# Patient Record
Sex: MALE | Race: WHITE | NOT HISPANIC OR LATINO | Employment: OTHER | URBAN - METROPOLITAN AREA
[De-identification: names, ages, dates, MRNs, and addresses within clinical notes are randomized per-mention and may not be internally consistent; named-entity substitution may affect disease eponyms.]

---

## 2017-02-26 ENCOUNTER — HOSPITAL ENCOUNTER (EMERGENCY)
Facility: HOSPITAL | Age: 27
Discharge: HOME | End: 2017-02-26
Attending: EMERGENCY MEDICINE
Payer: MEDICAID

## 2017-02-26 VITALS
WEIGHT: 170 LBS | SYSTOLIC BLOOD PRESSURE: 113 MMHG | OXYGEN SATURATION: 97 % | RESPIRATION RATE: 18 BRPM | HEART RATE: 98 BPM | DIASTOLIC BLOOD PRESSURE: 69 MMHG | TEMPERATURE: 98 F | HEIGHT: 67 IN | BODY MASS INDEX: 26.68 KG/M2
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
## 2017-02-26 DIAGNOSIS — R40.4 ALTERED LEVEL OF CONSCIOUSNESS: ICD-10-CM

## 2017-02-26 DIAGNOSIS — F10.929 ALCOHOL INTOXICATION, WITH UNSPECIFIED COMPLICATION: Primary | ICD-10-CM

## 2017-02-26 LAB
ALBUMIN SERPL BCP-MCNC: 4.5 G/DL
ALP SERPL-CCNC: 72 U/L
ALT SERPL W/O P-5'-P-CCNC: 18 U/L
AMPHET+METHAMPHET UR QL: NEGATIVE
ANION GAP SERPL CALC-SCNC: 13 MMOL/L
APTT BLDCRRT: 24.6 SEC
AST SERPL-CCNC: 23 U/L
BARBITURATES UR QL SCN>200 NG/ML: NEGATIVE
BASOPHILS # BLD AUTO: 0.03 K/UL
BASOPHILS NFR BLD: 0.4 %
BENZODIAZ UR QL SCN>200 NG/ML: NEGATIVE
BILIRUB SERPL-MCNC: 0.3 MG/DL
BILIRUB UR QL STRIP: NEGATIVE
BUN SERPL-MCNC: 8 MG/DL
BZE UR QL SCN: NEGATIVE
CALCIUM SERPL-MCNC: 9.2 MG/DL
CANNABINOIDS UR QL SCN: NEGATIVE
CHLORIDE SERPL-SCNC: 108 MMOL/L
CLARITY UR REFRACT.AUTO: CLEAR
CO2 SERPL-SCNC: 22 MMOL/L
COLOR UR AUTO: NORMAL
CREAT SERPL-MCNC: 0.9 MG/DL
CREAT UR-MCNC: 16.1 MG/DL
DIFFERENTIAL METHOD: NORMAL
EOSINOPHIL # BLD AUTO: 0.1 K/UL
EOSINOPHIL NFR BLD: 1.5 %
ERYTHROCYTE [DISTWIDTH] IN BLOOD BY AUTOMATED COUNT: 12.7 %
EST. GFR  (AFRICAN AMERICAN): >60 ML/MIN/1.73 M^2
EST. GFR  (NON AFRICAN AMERICAN): >60 ML/MIN/1.73 M^2
ETHANOL SERPL-MCNC: 251 MG/DL
GLUCOSE SERPL-MCNC: 103 MG/DL
GLUCOSE UR QL STRIP: NEGATIVE
HCT VFR BLD AUTO: 43.5 %
HGB BLD-MCNC: 14.9 G/DL
HGB UR QL STRIP: NEGATIVE
INR PPP: 1
KETONES UR QL STRIP: NEGATIVE
LEUKOCYTE ESTERASE UR QL STRIP: NEGATIVE
LYMPHOCYTES # BLD AUTO: 2.6 K/UL
LYMPHOCYTES NFR BLD: 38 %
MCH RBC QN AUTO: 29.6 PG
MCHC RBC AUTO-ENTMCNC: 34.3 %
MCV RBC AUTO: 86 FL
METHADONE UR QL SCN>300 NG/ML: NEGATIVE
MONOCYTES # BLD AUTO: 0.4 K/UL
MONOCYTES NFR BLD: 6.2 %
NEUTROPHILS # BLD AUTO: 3.7 K/UL
NEUTROPHILS NFR BLD: 53.6 %
NITRITE UR QL STRIP: NEGATIVE
OPIATES UR QL SCN: NEGATIVE
PCP UR QL SCN>25 NG/ML: NEGATIVE
PH UR STRIP: 6 [PH] (ref 5–8)
PLATELET # BLD AUTO: 341 K/UL
PMV BLD AUTO: 10.4 FL
POTASSIUM SERPL-SCNC: 4.2 MMOL/L
PROT SERPL-MCNC: 7.2 G/DL
PROT UR QL STRIP: NEGATIVE
PROTHROMBIN TIME: 10 SEC
RBC # BLD AUTO: 5.04 M/UL
SODIUM SERPL-SCNC: 143 MMOL/L
SP GR UR STRIP: <=1.005 (ref 1–1.03)
TOXICOLOGY INFORMATION: ABNORMAL
URN SPEC COLLECT METH UR: NORMAL
UROBILINOGEN UR STRIP-ACNC: NEGATIVE EU/DL
WBC # BLD AUTO: 6.89 K/UL

## 2017-02-26 PROCEDURE — 93005 ELECTROCARDIOGRAM TRACING: CPT

## 2017-02-26 PROCEDURE — 80320 DRUG SCREEN QUANTALCOHOLS: CPT

## 2017-02-26 PROCEDURE — 85610 PROTHROMBIN TIME: CPT

## 2017-02-26 PROCEDURE — 93010 ELECTROCARDIOGRAM REPORT: CPT | Mod: ,,, | Performed by: INTERNAL MEDICINE

## 2017-02-26 PROCEDURE — 80053 COMPREHEN METABOLIC PANEL: CPT

## 2017-02-26 PROCEDURE — 85025 COMPLETE CBC W/AUTO DIFF WBC: CPT

## 2017-02-26 PROCEDURE — 99900035 HC TECH TIME PER 15 MIN (STAT)

## 2017-02-26 PROCEDURE — 82570 ASSAY OF URINE CREATININE: CPT

## 2017-02-26 PROCEDURE — 25000003 PHARM REV CODE 250: Performed by: EMERGENCY MEDICINE

## 2017-02-26 PROCEDURE — 99284 EMERGENCY DEPT VISIT MOD MDM: CPT | Mod: 25

## 2017-02-26 PROCEDURE — 81003 URINALYSIS AUTO W/O SCOPE: CPT

## 2017-02-26 PROCEDURE — 85730 THROMBOPLASTIN TIME PARTIAL: CPT

## 2017-02-26 PROCEDURE — P9612 CATHETERIZE FOR URINE SPEC: HCPCS

## 2017-02-26 RX ADMIN — SODIUM CHLORIDE 1000 ML: 0.9 INJECTION, SOLUTION INTRAVENOUS at 08:02

## 2017-02-27 NOTE — ED NOTES
Pt attempted to urinate in urinal for urine specimen but was unable to urinate. MD states ok to straight cath. Straight cath performed using sterile technique. Pt tolerated.

## 2017-02-27 NOTE — DISCHARGE INSTRUCTIONS
"  Alcohol Intoxication  Alcohol intoxication occurs when you drink alcohol faster than your liver can remove it from your system. The following facts are important to remember:  · It can take 10 minutes or more to start to feel the effects of a drink, so you can easily get more intoxicated than you intended.  · One drink may be more than 1 serving of alcohol. Depending on the drink, it can be 2 to 4 servings.  · It takes about an hour for your body to metabolize (clear) 1 serving. If you have more than 1 drink, it can take a couple of hours or more.  · Many things affect how drinks will affect you, including whether youve eaten, how fast you drink, your size, how much you normally drink (or not), medicines you take, chronic diseases you have, and gender.  Signs and symptoms of alcohol poisoning  The following are signs and symptoms of alcohol poisoning:  Mild impairment  · Reduced inhibitions  · Slurred speech  · Drowsiness  · Decreased fine motor skills  Moderate impairment  · Erratic behavior, aggression, depression  · Impaired judgment  · Confusion  · Concentration difficulties  · Coordination problems  Severe impairment  · Vomiting  · Seizures  · Unconsciousness  · Cold, clammy  · Slow or irregular breathing  · Hypothermia (low body temperature)  · Coma  Health effects  Alcohol abuse causes health problems. Sometimes this can happen after only drinking a little." There is no set number of drinks or amount of alcohol that defines too much. The more you drink at one time, and the more frequently you drink determine both the short-term and long-term health effects. It affects all parts of your body and your health, including your:  · Brain. Alcohol is a central nervous system depressant. It can damage parts of the brain that affect your balance, memory, thinking, and emotions. It can cause memory loss, blackouts, depression, agitation, sleep cycle changes, and seizures. These changes may or may not be " reversible.  · Heart and vascular system. Alcohol affects multiple areas. It can damage heart muscle causing cardiomyopathy, which is a weakening and stretching of the heart muscle. This can lead to trouble breathing, an irregular heartbeat, atrial fibrillation, leg swelling, and heart failure. It makes the blood vessels stiffen causing hypertension (high blood pressure). All of these problems increase your risk of having heart attacks or strokes.  · Liver. Alcohol causes fat to build up in the liver, affecting its normal function. This increases the risk for hepatitis, leading to abdominal pain, appetite loss, jaundice, bleeding problems, liver fibrosis, and cirrhosis. This in turn can affect your ability to fight off infections, and can cause diabetes. The liver changes prevent it from removing toxins in your blood that can cause encephalopathy. Signs of this are confusion, altered level of consciousness, personality changes, memory loss, seizures, coma, and death.  · Pancreas. Alcohol can cause inflammation of the pancreas, or pancreatitis. This can cause pain in your abdomen, fever, and diabetes.  · Immune system. Alcohol weakens your immune system in a number of ways. It suppresses your immune system making it harder to fight off infections and colds. You will also have a higher risk of certain infections like pneumonia and tuberculosis.  · Cancer risk. Alcohol raises your risk of cancer of the mouth, esophagus, pharynx, larynx, liver, and breast.  · Sexual function. Alcohol abuse can also lead to sexual problems.  Alcohol use during pregnancy may cause permanent damage to the growing baby.  Home care  The following guidelines will help you care for yourself at home:  · Don't drink any more alcohol.  · Don't drive until all effects of the alcohol have worn off.  · Don't operate machinery that can cause injuries.  · Get lots of rest over the next few days. Drink plenty of water and other non-alcoholic liquids.  Try to eat regular meals.  · If you have been drinking heavily on a daily basis, you may go through alcohol withdrawal. The usual symptoms last 3 to 4 days and may include nervousness, shakiness, nausea, sweating, sleeplessness, and can even cause seizures and a serious withdrawal symptom called delirium tremens, or DTs. During this time, it is best that you stay with family or friends who can help and support you. You can also admit yourself to a residential detox program. If your symptoms are severe (seizures, severe shakiness, confusion), contact your doctor or call an ambulance for help (see below).   Follow-up care  If alcohol is a problem in your life, these are some organizations that can help you:  · Alcoholics Anonymous offers support through a self-help fellowship. There are no dues or fees. See the Yellow Pages and call for time and place of meetings. Find AA online at www.aa.org.  · [facility] offers support to families of alcohol users. Contact 470-034-1444, or online at www.al-anon.org.  · National [employer] on Alcoholism and Drug Dependence can be reached at 559-325-1700, or online at www.ncadd.org.  · There are also inpatient and residential alcohol detox programs. Check the Internet or phonebook Yellow Pages under Drug Abuse and Treatment Centers.  Call 911  Call 911 if any of these occur:  · Trouble breathing or slow irregular breathing  · Chest pain  · Sudden weakness on one side of your body or sudden trouble speaking  · Heavy bleeding or vomiting blood  · Very drowsy or trouble awakening  · Fainting or loss of consciousness  · Rapid heart rate  · Seizure  When to seek medical advice  Call your healthcare provider right away if any of these occur:  · Severe shakiness   · Fever over 100.4º F (38.0º C)  · Confusion or hallucinations (seeing, hearing, or feeling things that are not there)  · Pain in your upper abdomen that gets worse  · Repeated vomiting  Date Last Reviewed: 6/1/2016  © 9480-7445 [employer]. [address] [place] [address], [place] [address]. All rights reserved. This information is not intended as a substitute for professional medical care. Always follow your healthcare professional's instructions.          Uncertain Causes of Fall  You have had a fall today. But the cause of your fall is not certain. Falls can occur due to slipping, tripping or losing your balance. A fall can also occur from a fainting spell or seizure.  While a fall can happen for a simple reason (tripping over something), falls in elderly people are often caused by a combination of things:  · Age-related decline in function with worsening balance, stability, vision, and muscle strength  · Chronic illness such as heart arrhythmias, heart valve disease, vascular disease, COPD, diabetes, strokes, arthritis  · Effects or side effects of medicines  · Dehydration.  · Environmental hazards such as uneven or slippery ground, unfamiliar place, obstacles, uneven surfaces, or slippery ground  · Situational factors (related to the activity being done, e.g., rushing to the bathroom)  Because the cause of your fall today is not certain, it is possible that a fainting spell or seizure was the cause. This means that it could happen again, without warning. If you fall again, without a cause, then you should return to this facility promptly to have further tests. Otherwise, follow up with your doctor as explained below.  It is normal to feel sore and tight in your muscles and back the next day, and not just the muscles you initially injured. Remember, all the parts of your body are connected, so while initially one area hurts, the next day another may hurt. Also, when you injure yourself, it causes inflammation, which then causes the muscles to tighten up and hurt more. After the initial worsening, it should gradually improve over the next few days. However, more severe pain should be reported.  Even without a definite head injury, you  can still get a concussion. Concussions and even bleeding can still occur, especially if you have had a recent injury or take blood thinner medicine. It is not unusual to have a mild headache and feel tired and even nauseous or dizzy.  Home care  · Rest today and resume your normal activities as soon as you are feeling back to normal. It is best to remain with someone who can check on you for the next 24 hours to watch for another episode of falling.  · If you were injured during the fall, follow the advice from your doctor regarding care of your injury.  ·  If you become light-headed or dizzy, lie down immediately or sit and lean forward with your head down.  · As a precaution, do not drive a car or operate dangerous equipment, do not take a bath alone (use a shower instead) and do not swim alone until you see your doctor. A condition causing fainting or seizures must be ruled out before resuming these activities.  · You may use acetaminophen or ibuprofen to control pain, unless another pain medicine was prescribed. If you have chronic liver or kidney disease or ever had a stomach ulcer or gastrointestinal bleeding, talk with your doctor before using these medicines.  · Keep your appointments for any further testing that may have been scheduled for you.  Follow-up care  Follow up with your healthcare provider, or as advised.  If X-rays or CT scan were done, you will be notified if there is a change in the reading, especially if it affects treatment.  Call 911  Call 911 if any of these occur:  · Trouble breathing  · Confused or difficulty arousing  · Fainting or loss of consciousness  · Rapid or very slow heart rate  · Seizure  · Difficulty with speech or vision, weakness of an arm or leg  · Difficulty walking or talking, loss of balance, numbness or weakness in one side of your body, facial droop  When to seek medical advice  Call your healthcare provider right away if any of these occur:  · Another unexplained  fall  · Dizziness  · Severe headache  · Nausea and vomiting  · Blood in vomit, stools (black or red color)  Date Last Reviewed: 11/5/2015  © 8292-2899 [employer]. [address] [place] [address], [place] [address]. All rights reserved. This information is not intended as a substitute for professional medical care. Always follow your healthcare professional's instructions.

## 2017-02-27 NOTE — ED NOTES
Pt states unable to provide urine specimen. TAYLOR Espinoza at bedside to perform in & out cath per MD order.

## 2017-02-27 NOTE — ED NOTES
Pt states no further needs/concerns. resp even, unlabored. No distress noted. Pt AAOx3 and answers all questions appropriately. Pt tranfers from stretcher to wheelchair with no assistance needed. Will d/c per MD order.
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
## 2017-02-27 NOTE — ED PROVIDER NOTES
SCRIBE #1 NOTE: I, Matt Birch, am scribing for, and in the presence of, No att. providers found. I have scribed the entire note.      History      Chief Complaint   Patient presents with    Altered Mental Status     per family member, pt had been out drinking w/ grandpaw all day & came home altered. unsure of drug use? pt denies. c/o hip & pelvic pain.        Review of patient's allergies indicates:  No Known Allergies     HPI   HPI    2/26/2017, 8:17 PM   History obtained from the patient      History of Present Illness: Jared Michael Cowden is a 27 y.o. male patient who presents to the Emergency Department for abnormal behavior and alcohol ingestion.  The patient was at a local bar this evening and was playing music and was out with his family members.  The patient appeared to be acting normally according to family and friends.  The patient then went home, and the family noted the patient started acting inappropriately fidgeting his legs and then began crying and complaining about pain from a fall a week ago.  The patient admitted to drinking eight 12 ounce beers tonight but denied taking any illegal or illicit substances.  The patient does complain of bilateral elbow pain and left hip pain; there is no history mention of fever chills nausea or vomiting.  The patient is also tearful and has noted facial contortions while talking.  There are no other major concerns or complaints noted at this time.      Arrival mode: Personal vehicle      PCP: Lorenzo Morley MD       Past Medical History:  History reviewed. No pertinent past medical history.    Past Surgical History:  Past Surgical History:   Procedure Laterality Date    ANKLE SURGERY      Right    HIP SURGERY      PELVIC SYMPHYSIS FUSION           Family History:  History reviewed. No pertinent family history.    Social History:  Social History     Social History Main Topics    Smoking status: Former Smoker    Smokeless tobacco: Never Used    Alcohol  use Yes    Drug use: No      Comment: pt denies     Sexual activity: Not on file       ROS   Review of Systems   Constitutional: Negative for fever.   HENT: Negative for sore throat.    Respiratory: Negative for shortness of breath.    Cardiovascular: Negative for chest pain.   Gastrointestinal: Negative for nausea.   Genitourinary: Negative for dysuria.   Musculoskeletal: Negative for back pain.        Bilateral elbow and left hip pain   Skin: Negative for rash.   Neurological: Negative for weakness.   Hematological: Does not bruise/bleed easily.   Psychiatric/Behavioral: Positive for agitation.        Tearful   All other systems reviewed and are negative.      Physical Exam    Initial Vitals   BP Pulse Resp Temp SpO2   02/26/17 2002 02/26/17 2002 02/26/17 2002 -- 02/26/17 2002   147/77 120 20  96 %      Physical Exam  Nursing Notes and Vital Signs Reviewed.  Constitutional: Patient is in no acute distress. Awake and alert. Well-developed and well-nourished.  Tearful and crying with facial contortions.  Head: Atraumatic. Normocephalic.  Eyes: PERRL. EOM intact. Conjunctivae are not pale. No scleral icterus.  ENT: Mucous membranes are moist. Oropharynx is clear and symmetric.    Neck: Supple. Full ROM. No lymphadenopathy.  Cardiovascular: Regular rate. Regular rhythm. No murmurs, rubs, or gallops. Distal pulses are 2+ and symmetric.  Pulmonary/Chest: No respiratory distress. Clear to auscultation bilaterally. No wheezing, rales, or rhonchi.  Abdominal: Soft and non-distended.  There is no tenderness.  No rebound, guarding, or rigidity. Good bowel sounds.  Genitourinary: No CVA tenderness  Musculoskeletal: Moves all extremities. No obvious deformities. No edema. No calf tenderness.  Skin: Warm and dry.  Neurological:  Alert, awake, and appropriate.  Normal speech.  No acute focal neurological deficits are appreciated.  Psychiatric: Normal affect. Good eye contact. Appropriate in content.    ED Course   "  Procedures  ED Vital Signs:  Vitals:    02/26/17 2002 02/26/17 2006 02/26/17 2109 02/26/17 2255   BP: (!) 147/77  121/77 113/69   Pulse: (!) 120 (!) 127 109 98   Resp: 20 19 18   Temp: 97.9 °F (36.6 °C)   97.9 °F (36.6 °C)   TempSrc: Oral   Oral   SpO2: 96%  98% 97%   Weight: 77.1 kg (170 lb)      Height: 5' 7" (1.702 m)          Abnormal Lab Results:  Labs Reviewed   COMPREHENSIVE METABOLIC PANEL - Abnormal; Notable for the following:        Result Value    CO2 22 (*)     All other components within normal limits   DRUG SCREEN PANEL, URINE EMERGENCY - Abnormal; Notable for the following:     Creatinine, Random Ur 16.1 (*)     All other components within normal limits   ALCOHOL,MEDICAL (ETHANOL) - Abnormal; Notable for the following:     Alcohol, Medical, Serum 251 (*)     All other components within normal limits   APTT   PROTIME-INR   CBC W/ AUTO DIFFERENTIAL   URINALYSIS        All Lab Results:  Results for orders placed or performed during the hospital encounter of 02/26/17   APTT   Result Value Ref Range    aPTT 24.6 21.0 - 32.0 sec   Protime-INR   Result Value Ref Range    Prothrombin Time 10.0 9.0 - 12.5 sec    INR 1.0 0.8 - 1.2   CBC auto differential   Result Value Ref Range    WBC 6.89 3.90 - 12.70 K/uL    RBC 5.04 4.60 - 6.20 M/uL    Hemoglobin 14.9 14.0 - 18.0 g/dL    Hematocrit 43.5 40.0 - 54.0 %    MCV 86 82 - 98 fL    MCH 29.6 27.0 - 31.0 pg    MCHC 34.3 32.0 - 36.0 %    RDW 12.7 11.5 - 14.5 %    Platelets 341 150 - 350 K/uL    MPV 10.4 9.2 - 12.9 fL    Gran # 3.7 1.8 - 7.7 K/uL    Lymph # 2.6 1.0 - 4.8 K/uL    Mono # 0.4 0.3 - 1.0 K/uL    Eos # 0.1 0.0 - 0.5 K/uL    Baso # 0.03 0.00 - 0.20 K/uL    Gran% 53.6 38.0 - 73.0 %    Lymph% 38.0 18.0 - 48.0 %    Mono% 6.2 4.0 - 15.0 %    Eosinophil% 1.5 0.0 - 8.0 %    Basophil% 0.4 0.0 - 1.9 %    Differential Method Automated    Comprehensive metabolic panel   Result Value Ref Range    Sodium 143 136 - 145 mmol/L    Potassium 4.2 3.5 - 5.1 mmol/L    " Chloride 108 95 - 110 mmol/L    CO2 22 (L) 23 - 29 mmol/L    Glucose 103 70 - 110 mg/dL    BUN, Bld 8 6 - 20 mg/dL    Creatinine 0.9 0.5 - 1.4 mg/dL    Calcium 9.2 8.7 - 10.5 mg/dL    Total Protein 7.2 6.0 - 8.4 g/dL    Albumin 4.5 3.5 - 5.2 g/dL    Total Bilirubin 0.3 0.1 - 1.0 mg/dL    Alkaline Phosphatase 72 55 - 135 U/L    AST 23 10 - 40 U/L    ALT 18 10 - 44 U/L    Anion Gap 13 8 - 16 mmol/L    eGFR if African American >60.0 >60 mL/min/1.73 m^2    eGFR if non African American >60.0 >60 mL/min/1.73 m^2   Drug screen panel, emergency   Result Value Ref Range    Benzodiazepines Negative     Methadone metabolites Negative     Cocaine (Metab.) Negative     Opiate Scrn, Ur Negative     Barbiturate Screen, Ur Negative     Amphetamine Screen, Ur Negative     THC Negative     Phencyclidine Negative     Creatinine, Random Ur 16.1 (L) 23.0 - 375.0 mg/dL    Toxicology Information SEE COMMENT    Ethanol   Result Value Ref Range    Alcohol, Medical, Serum 251 (H) <10 mg/dL   Urinalysis   Result Value Ref Range    Specimen UA Urine, Clean Catch     Color, UA Straw Yellow, Straw, Sirisha    Appearance, UA Clear Clear    pH, UA 6.0 5.0 - 8.0    Specific Gravity, UA <=1.005 1.005 - 1.030    Protein, UA Negative Negative    Glucose, UA Negative Negative    Ketones, UA Negative Negative    Bilirubin (UA) Negative Negative    Occult Blood UA Negative Negative    Nitrite, UA Negative Negative    Urobilinogen, UA Negative <2.0 EU/dL    Leukocytes, UA Negative Negative         Imaging Results:  Imaging Results         X-Ray Elbow Complete Bilateral (Final result) Result time:  02/26/17 21:05:55    Final result by Diogenes Campos MD (02/26/17 21:05:55)    Impression:     Normal bilateral elbows      Electronically signed by: DIOGENES CAMPOS MD  Date:     02/26/17  Time:    21:05     Narrative:    Bilateral elbow 3 views    Clinical indication: Elbow pain acute trauma    Findings: The elbows are intact on both sides.  No fractures.  No  sign of joint effusion.            X-Ray Hip 2 View Left (Final result) Result time:  02/26/17 21:03:31    Final result by Diogenes Campos MD (02/26/17 21:03:31)    Impression:     Normal left hip      Electronically signed by: DIOGENES CAMPOS MD  Date:     02/26/17  Time:    21:03     Narrative:    Left hip 2 views    Clinical indication: Hip pain acute trauma    Findings: The hip joint spaces preserved.  No fractures or dislocations.  There is a metal plate along the symphysis pubis along with a separate screw through the left SI joint.  These are old.            X-Ray Chest 1 View (Final result) Result time:  02/26/17 21:04:05    Final result by Diogenes Campos MD (02/26/17 21:04:05)    Impression:     Normal chest      Electronically signed by: DIOGENES CAMPOS MD  Date:     02/26/17  Time:    21:04     Narrative:    Single view chest    Clinical indication: Chest trauma    Findings: The heart and lungs are normal.  No infiltrates.  No pneumothorax.            CT Head Without Contrast (Final result) Result time:  02/26/17 21:02:45    Final result by Diogenes Campos MD (02/26/17 21:02:45)    Impression:     Normal CT of the brain      Electronically signed by: DIOGENES CAMPOS MD  Date:     02/26/17  Time:    21:02     Narrative:    CT of the brain without contrast    Clinical indication: Transient alteration of awareness    Findings: The ventricles are normal in size and position without midline shift.  Cerebral density is normal.  No hemorrhage or mass effect.                EKG Readings: (Independently Interpreted)   Initial Reading: No STEMI. Rhythm: Sinus Tachycardia. Heart Rate: 118. Ectopy: No Ectopy. Conduction: Normal. Axis: Normal.        The Emergency Provider reviewed the vital signs and test results, which are outlined above.    ED Discussion     8:45 PM - Re-evaluation:  The patient is resting comfortably and is in no acute distress. He states that his symptoms have improved after treatment  within ER. Discussed test results and notified of pending labs. Answered questions at this time.     9:06 AM - Re-evaluation: The patient is resting comfortably and is in no acute distress. He states that his symptoms have improved after treatment within ER. Discussed test results, shared treatment plan, specific conditions for return, and importance of follow up with patient and family.  He understands and agrees with the plan as discussed. Answered  his questions at this time. He has remained hemodynamically stable throughout the ED course and is appropriate for discharge home.  The patient will be discharged home with instructions to follow-up with his primary care physician or return to the ED for any worsening symptoms or any other major concern.  The patient is also advised to quit drinking so heavily.    ED Medication(s):  Medications   sodium chloride 0.9% bolus 1,000 mL (0 mLs Intravenous Stopped 2/26/17 2108)       There are no discharge medications for this patient.      Follow-up Information     Follow up with Lorenzo Morley MD In 3 days.    Specialty:  Family Medicine    Contact information:    [address] [address]  [facility] [address] [phone]          Follow up with [facility].    Specialty:  Emergency Medicine    Why:  If symptoms worsen, Or worsening condition or any other major concern    Contact information:    [address] [address] [address] [phone]            Medical Decision Making    Medical Decision Making:   Clinical Tests:   Lab Tests: Ordered and Reviewed  The following lab test(s) were unremarkable: CBC, CMP, PT, PTT and Urinalysis       <> Summary of Lab: Urine drug screen, blood alcohol level  Radiological Study: Ordered and Reviewed  Medical Tests: Ordered and Reviewed           Scribe Attestation:   Scribe #1: I performed the above scribed service and the documentation accurately describes the services I performed. I attest to  the accuracy of the note.    Attending:   Physician Attestation Statement for Scribe #1: I, No att. providers found, personally performed the services described in this documentation, as scribed by Matt Birch, in my presence, and it is both accurate and complete.          Clinical Impression       ICD-10-CM ICD-9-CM   1. Alcohol intoxication, with unspecified complication F10.129 305.00   2. Altered level of consciousness R40.4 780.09   3. Fall W19.XXXA E888.9       Disposition:   Disposition: Discharged  Condition: Stable         Matt Birch MD  02/27/17 0047

## 2017-07-17 ENCOUNTER — HOSPITAL ENCOUNTER (EMERGENCY)
Facility: HOSPITAL | Age: 27
Discharge: HOME | End: 2017-07-17
Attending: EMERGENCY MEDICINE
Payer: MEDICAID

## 2017-07-17 VITALS
HEART RATE: 109 BPM | TEMPERATURE: 98 F | OXYGEN SATURATION: 95 % | WEIGHT: 175 LBS | DIASTOLIC BLOOD PRESSURE: 65 MMHG | SYSTOLIC BLOOD PRESSURE: 132 MMHG | BODY MASS INDEX: 26.52 KG/M2 | HEIGHT: 68 IN | RESPIRATION RATE: 20 BRPM

## 2017-07-17 DIAGNOSIS — M25.552 CHRONIC HIP PAIN, LEFT: Primary | ICD-10-CM

## 2017-07-17 DIAGNOSIS — G89.29 CHRONIC HIP PAIN, LEFT: Primary | ICD-10-CM

## 2017-07-17 DIAGNOSIS — R03.0 ELEVATED BLOOD PRESSURE READING WITHOUT DIAGNOSIS OF HYPERTENSION: ICD-10-CM

## 2017-07-17 PROCEDURE — 99284 EMERGENCY DEPT VISIT MOD MDM: CPT | Mod: 25

## 2017-07-17 PROCEDURE — 96372 THER/PROPH/DIAG INJ SC/IM: CPT

## 2017-07-17 PROCEDURE — 63600175 PHARM REV CODE 636 W HCPCS: Performed by: EMERGENCY MEDICINE

## 2017-07-17 PROCEDURE — 25000003 PHARM REV CODE 250: Performed by: EMERGENCY MEDICINE

## 2017-07-17 RX ORDER — KETOROLAC TROMETHAMINE 10 MG/1
10 TABLET, FILM COATED ORAL
Status: COMPLETED | OUTPATIENT
Start: 2017-07-17 | End: 2017-07-17

## 2017-07-17 RX ORDER — GABAPENTIN 300 MG/1
2 CAPSULE ORAL 3 TIMES DAILY
Status: ON HOLD | COMMUNITY
Start: 2017-07-06 | End: 2021-07-16 | Stop reason: HOSPADM

## 2017-07-17 RX ORDER — KETOROLAC TROMETHAMINE 10 MG/1
10 TABLET, FILM COATED ORAL EVERY 6 HOURS
Qty: 20 TABLET | Refills: 0 | Status: SHIPPED | OUTPATIENT
Start: 2017-07-17 | End: 2017-09-23 | Stop reason: ALTCHOICE

## 2017-07-17 RX ORDER — CYCLOBENZAPRINE HCL 10 MG
10 TABLET ORAL 3 TIMES DAILY PRN
Qty: 15 TABLET | Refills: 0 | Status: SHIPPED | OUTPATIENT
Start: 2017-07-17 | End: 2017-07-22

## 2017-07-17 RX ORDER — ORPHENADRINE CITRATE 30 MG/ML
60 INJECTION INTRAMUSCULAR; INTRAVENOUS
Status: COMPLETED | OUTPATIENT
Start: 2017-07-17 | End: 2017-07-17

## 2017-07-17 RX ADMIN — ORPHENADRINE CITRATE 60 MG: 30 INJECTION INTRAMUSCULAR; INTRAVENOUS at 01:07

## 2017-07-17 RX ADMIN — KETOROLAC TROMETHAMINE 10 MG: 10 TABLET, FILM COATED ORAL at 01:07
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
## 2017-07-22 ENCOUNTER — HOSPITAL ENCOUNTER (EMERGENCY)
Facility: HOSPITAL | Age: 27
Discharge: HOME | End: 2017-07-23
Attending: EMERGENCY MEDICINE
Payer: MEDICAID

## 2017-07-22 DIAGNOSIS — M54.9 ACUTE BILATERAL BACK PAIN, UNSPECIFIED BACK LOCATION: Primary | ICD-10-CM

## 2017-07-22 DIAGNOSIS — Z76.0 MEDICATION REFILL: ICD-10-CM

## 2017-07-22 DIAGNOSIS — R00.0 TACHYCARDIA: ICD-10-CM

## 2017-07-22 LAB
ALBUMIN SERPL BCP-MCNC: 4 G/DL
ALP SERPL-CCNC: 67 U/L
ALT SERPL W/O P-5'-P-CCNC: 17 U/L
AMPHET+METHAMPHET UR QL: NEGATIVE
ANION GAP SERPL CALC-SCNC: 14 MMOL/L
AST SERPL-CCNC: 26 U/L
BARBITURATES UR QL SCN>200 NG/ML: NEGATIVE
BASOPHILS # BLD AUTO: 0.02 K/UL
BASOPHILS NFR BLD: 0.2 %
BENZODIAZ UR QL SCN>200 NG/ML: NEGATIVE
BILIRUB SERPL-MCNC: 0.3 MG/DL
BILIRUB UR QL STRIP: NEGATIVE
BUN SERPL-MCNC: 10 MG/DL
BZE UR QL SCN: NEGATIVE
CALCIUM SERPL-MCNC: 8.5 MG/DL
CANNABINOIDS UR QL SCN: NEGATIVE
CHLORIDE SERPL-SCNC: 109 MMOL/L
CLARITY UR REFRACT.AUTO: CLEAR
CO2 SERPL-SCNC: 20 MMOL/L
COLOR UR AUTO: YELLOW
CREAT SERPL-MCNC: 0.8 MG/DL
CREAT UR-MCNC: 70.8 MG/DL
CRP SERPL-MCNC: 1.4 MG/L
DIFFERENTIAL METHOD: ABNORMAL
EOSINOPHIL # BLD AUTO: 0 K/UL
EOSINOPHIL NFR BLD: 0.2 %
ERYTHROCYTE [DISTWIDTH] IN BLOOD BY AUTOMATED COUNT: 12.6 %
EST. GFR  (AFRICAN AMERICAN): >60 ML/MIN/1.73 M^2
EST. GFR  (NON AFRICAN AMERICAN): >60 ML/MIN/1.73 M^2
GLUCOSE SERPL-MCNC: 112 MG/DL
GLUCOSE UR QL STRIP: NEGATIVE
HCT VFR BLD AUTO: 43.6 %
HGB BLD-MCNC: 15 G/DL
HGB UR QL STRIP: NEGATIVE
KETONES UR QL STRIP: NEGATIVE
LEUKOCYTE ESTERASE UR QL STRIP: NEGATIVE
LYMPHOCYTES # BLD AUTO: 1.5 K/UL
LYMPHOCYTES NFR BLD: 17.3 %
MCH RBC QN AUTO: 28.9 PG
MCHC RBC AUTO-ENTMCNC: 34.4 G/DL
MCV RBC AUTO: 84 FL
METHADONE UR QL SCN>300 NG/ML: NEGATIVE
MONOCYTES # BLD AUTO: 0.4 K/UL
MONOCYTES NFR BLD: 4.1 %
NEUTROPHILS # BLD AUTO: 6.9 K/UL
NEUTROPHILS NFR BLD: 77.9 %
NITRITE UR QL STRIP: NEGATIVE
OPIATES UR QL SCN: NEGATIVE
PCP UR QL SCN>25 NG/ML: NEGATIVE
PH UR STRIP: 7 [PH] (ref 5–8)
PLATELET # BLD AUTO: 289 K/UL
PMV BLD AUTO: 10.3 FL
POTASSIUM SERPL-SCNC: 3.7 MMOL/L
PROT SERPL-MCNC: 7.1 G/DL
PROT UR QL STRIP: NEGATIVE
RBC # BLD AUTO: 5.19 M/UL
SODIUM SERPL-SCNC: 143 MMOL/L
SP GR UR STRIP: 1.01 (ref 1–1.03)
TOXICOLOGY INFORMATION: NORMAL
URN SPEC COLLECT METH UR: NORMAL
UROBILINOGEN UR STRIP-ACNC: NEGATIVE EU/DL
WBC # BLD AUTO: 8.88 K/UL

## 2017-07-22 PROCEDURE — 99285 EMERGENCY DEPT VISIT HI MDM: CPT | Mod: 25

## 2017-07-22 PROCEDURE — 80307 DRUG TEST PRSMV CHEM ANLYZR: CPT

## 2017-07-22 PROCEDURE — 96375 TX/PRO/DX INJ NEW DRUG ADDON: CPT

## 2017-07-22 PROCEDURE — 96361 HYDRATE IV INFUSION ADD-ON: CPT

## 2017-07-22 PROCEDURE — 25000003 PHARM REV CODE 250: Performed by: EMERGENCY MEDICINE

## 2017-07-22 PROCEDURE — 85025 COMPLETE CBC W/AUTO DIFF WBC: CPT

## 2017-07-22 PROCEDURE — 93010 ELECTROCARDIOGRAM REPORT: CPT | Mod: ,,, | Performed by: NUCLEAR MEDICINE

## 2017-07-22 PROCEDURE — 81003 URINALYSIS AUTO W/O SCOPE: CPT

## 2017-07-22 PROCEDURE — 63600175 PHARM REV CODE 636 W HCPCS: Performed by: EMERGENCY MEDICINE

## 2017-07-22 PROCEDURE — 80053 COMPREHEN METABOLIC PANEL: CPT

## 2017-07-22 PROCEDURE — 96374 THER/PROPH/DIAG INJ IV PUSH: CPT

## 2017-07-22 PROCEDURE — 86140 C-REACTIVE PROTEIN: CPT

## 2017-07-22 RX ORDER — GABAPENTIN 300 MG/1
300 CAPSULE ORAL
Status: COMPLETED | OUTPATIENT
Start: 2017-07-22 | End: 2017-07-22

## 2017-07-22 RX ORDER — ONDANSETRON 2 MG/ML
4 INJECTION INTRAMUSCULAR; INTRAVENOUS
Status: COMPLETED | OUTPATIENT
Start: 2017-07-22 | End: 2017-07-22

## 2017-07-22 RX ORDER — GABAPENTIN 300 MG/1
300 CAPSULE ORAL 3 TIMES DAILY
Qty: 90 CAPSULE | Refills: 11 | Status: SHIPPED | OUTPATIENT
Start: 2017-07-22 | End: 2018-07-22

## 2017-07-22 RX ORDER — HYDROMORPHONE HYDROCHLORIDE 2 MG/ML
1 INJECTION, SOLUTION INTRAMUSCULAR; INTRAVENOUS; SUBCUTANEOUS
Status: COMPLETED | OUTPATIENT
Start: 2017-07-22 | End: 2017-07-22

## 2017-07-22 RX ADMIN — HYDROMORPHONE HYDROCHLORIDE 1 MG: 2 INJECTION, SOLUTION INTRAMUSCULAR; INTRAVENOUS; SUBCUTANEOUS at 09:07

## 2017-07-22 RX ADMIN — SODIUM CHLORIDE 1000 ML: 0.9 INJECTION, SOLUTION INTRAVENOUS at 10:07

## 2017-07-22 RX ADMIN — ONDANSETRON 4 MG: 2 INJECTION INTRAMUSCULAR; INTRAVENOUS at 09:07

## 2017-07-22 RX ADMIN — GABAPENTIN 300 MG: 300 CAPSULE ORAL at 10:07

## 2017-07-22 RX ADMIN — SODIUM CHLORIDE 1000 ML: 0.9 INJECTION, SOLUTION INTRAVENOUS at 09:07

## 2017-07-23 VITALS
OXYGEN SATURATION: 100 % | RESPIRATION RATE: 20 BRPM | HEART RATE: 82 BPM | HEIGHT: 68 IN | SYSTOLIC BLOOD PRESSURE: 126 MMHG | WEIGHT: 175 LBS | TEMPERATURE: 98 F | DIASTOLIC BLOOD PRESSURE: 64 MMHG | BODY MASS INDEX: 26.52 KG/M2
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
## 2017-09-23 ENCOUNTER — HOSPITAL ENCOUNTER (EMERGENCY)
Facility: HOSPITAL | Age: 27
Discharge: HOME | End: 2017-09-23
Attending: INTERNAL MEDICINE
Payer: MEDICAID

## 2017-09-23 VITALS
SYSTOLIC BLOOD PRESSURE: 122 MMHG | OXYGEN SATURATION: 98 % | TEMPERATURE: 98 F | RESPIRATION RATE: 18 BRPM | HEART RATE: 65 BPM | DIASTOLIC BLOOD PRESSURE: 71 MMHG | WEIGHT: 165 LBS | BODY MASS INDEX: 25.09 KG/M2

## 2017-09-23 DIAGNOSIS — K04.7 TOOTH ABSCESS: Primary | ICD-10-CM

## 2017-09-23 PROCEDURE — 63600175 PHARM REV CODE 636 W HCPCS: Performed by: INTERNAL MEDICINE

## 2017-09-23 PROCEDURE — 99283 EMERGENCY DEPT VISIT LOW MDM: CPT | Mod: 25

## 2017-09-23 PROCEDURE — 25000003 PHARM REV CODE 250: Performed by: INTERNAL MEDICINE

## 2017-09-23 PROCEDURE — S0077 INJECTION, CLINDAMYCIN PHOSP: HCPCS | Performed by: INTERNAL MEDICINE

## 2017-09-23 PROCEDURE — 96372 THER/PROPH/DIAG INJ SC/IM: CPT

## 2017-09-23 RX ORDER — HYDROCODONE BITARTRATE AND ACETAMINOPHEN 10; 325 MG/1; MG/1
1 TABLET ORAL
Status: COMPLETED | OUTPATIENT
Start: 2017-09-23 | End: 2017-09-23

## 2017-09-23 RX ORDER — KETOROLAC TROMETHAMINE 30 MG/ML
60 INJECTION, SOLUTION INTRAMUSCULAR; INTRAVENOUS
Status: COMPLETED | OUTPATIENT
Start: 2017-09-23 | End: 2017-09-23

## 2017-09-23 RX ORDER — CLINDAMYCIN PHOSPHATE 150 MG/ML
600 INJECTION, SOLUTION INTRAVENOUS
Status: COMPLETED | OUTPATIENT
Start: 2017-09-23 | End: 2017-09-23

## 2017-09-23 RX ORDER — CHLORHEXIDINE GLUCONATE ORAL RINSE 1.2 MG/ML
15 SOLUTION DENTAL 2 TIMES DAILY
Qty: 300 ML | Refills: 0 | Status: SHIPPED | OUTPATIENT
Start: 2017-09-23 | End: 2017-10-07

## 2017-09-23 RX ORDER — CLINDAMYCIN HYDROCHLORIDE 150 MG/1
300 CAPSULE ORAL 4 TIMES DAILY
Qty: 56 CAPSULE | Refills: 0 | Status: SHIPPED | OUTPATIENT
Start: 2017-09-23 | End: 2017-09-30

## 2017-09-23 RX ORDER — HYDROCODONE BITARTRATE AND ACETAMINOPHEN 10; 325 MG/1; MG/1
1 TABLET ORAL EVERY 4 HOURS PRN
Qty: 18 TABLET | Refills: 0 | Status: SHIPPED | OUTPATIENT
Start: 2017-09-23 | End: 2018-11-20

## 2017-09-23 RX ORDER — IBUPROFEN 600 MG/1
600 TABLET ORAL EVERY 6 HOURS PRN
Qty: 20 TABLET | Refills: 0 | Status: SHIPPED | OUTPATIENT
Start: 2017-09-23 | End: 2018-11-20

## 2017-09-23 RX ADMIN — CLINDAMYCIN PHOSPHATE 600 MG: 150 INJECTION, SOLUTION INTRAMUSCULAR; INTRAVENOUS at 09:09

## 2017-09-23 RX ADMIN — HYDROCODONE BITARTRATE AND ACETAMINOPHEN 1 TABLET: 10; 325 TABLET ORAL at 09:09

## 2017-09-23 RX ADMIN — KETOROLAC TROMETHAMINE 60 MG: 30 INJECTION, SOLUTION INTRAMUSCULAR at 09:09

## 2017-09-23 NOTE — ED PROVIDER NOTES
Encounter Date: 9/23/2017       History   Pt presents with tooth ache and facial swelling for the last 3 days. Denies fever, drooling or dysphagia. Pain is constant, sharp, moderate over his left jaw area.  Chief Complaint   Patient presents with    Dental Pain     left lower toothache with swelling     The history is provided by the patient.     Review of patient's allergies indicates:  No Known Allergies  Past Medical History:   Diagnosis Date    Chronic pain     Neuropathy      Past Surgical History:   Procedure Laterality Date    ANKLE SURGERY      Right    HIP SURGERY      PELVIC SYMPHYSIS FUSION       No family history on file.  Social History   Substance Use Topics    Smoking status: Former Smoker    Smokeless tobacco: Never Used    Alcohol use Yes     Review of Systems   Constitutional: Negative for chills and fever.   HENT: Positive for dental problem and facial swelling. Negative for drooling, ear pain, sore throat, trouble swallowing and voice change.    Eyes: Negative for visual disturbance.   Respiratory: Negative for cough and shortness of breath.    Cardiovascular: Negative for chest pain.   Gastrointestinal: Negative for abdominal pain, blood in stool, diarrhea and vomiting.   Genitourinary: Negative for difficulty urinating, discharge, dysuria and testicular pain.   Musculoskeletal: Positive for arthralgias (Chronic Lt ankle pain, taking neurontin). Negative for back pain and myalgias.   Skin: Negative for rash.   Neurological: Negative for weakness and headaches.   Psychiatric/Behavioral: Negative for sleep disturbance.   All other systems reviewed and are negative.      Physical Exam     Initial Vitals [09/23/17 0843]   BP Pulse Resp Temp SpO2   130/74 69 16 97.6 °F (36.4 °C) 98 %      MAP       92.67         Physical Exam    Nursing note and vitals reviewed.  Constitutional: He appears well-developed and well-nourished. No distress.   HENT:   Head: Normocephalic and atraumatic.   Right  Ear: External ear normal.   Left Ear: External ear normal.   Mouth/Throat: Oropharynx is clear and moist. No oropharyngeal exudate.   No trismus, no drooling, Nml voice. Extensive tooth disease, multiple cavities over left jaw area with associated peridental swelling   Eyes: Conjunctivae and EOM are normal. Pupils are equal, round, and reactive to light.   Neck: Normal range of motion. Neck supple. No tracheal deviation present. No JVD present.   Cardiovascular: Normal rate, regular rhythm, normal heart sounds and intact distal pulses.   Pulmonary/Chest: Breath sounds normal. No stridor. No respiratory distress.   Musculoskeletal: Normal range of motion. He exhibits no edema.   Neurological: He is alert and oriented to person, place, and time. He has normal strength.   Skin: Skin is warm and dry. Capillary refill takes less than 2 seconds. No rash noted.   Psychiatric: His behavior is normal.         ED Course   Procedures  Labs Reviewed - No data to display                            ED Course    Pre-hypertension/Hypertension: The pt has been informed that they may have pre-hypertension or hypertension based on a blood pressure reading in the ED. I recommend that the pt call the PCP listed on their discharge instructions or a physician of their choice this week to arrange f/u for further evaluation of possible pre-hypertension or hypertension.     Clinical Impression:   The encounter diagnosis was Tooth abscess.    Disposition:   Disposition: Discharged  Condition: Stable                        Romero Henley MD  09/23/17 3579

## 2018-04-04 ENCOUNTER — HOSPITAL ENCOUNTER (EMERGENCY)
Facility: HOSPITAL | Age: 28
Discharge: HOME | End: 2018-04-04
Payer: MEDICAID

## 2018-04-04 VITALS
WEIGHT: 158 LBS | RESPIRATION RATE: 20 BRPM | BODY MASS INDEX: 24.8 KG/M2 | DIASTOLIC BLOOD PRESSURE: 70 MMHG | TEMPERATURE: 98 F | OXYGEN SATURATION: 98 % | HEIGHT: 67 IN | SYSTOLIC BLOOD PRESSURE: 120 MMHG | HEART RATE: 72 BPM

## 2018-04-04 DIAGNOSIS — K04.7 DENTAL INFECTION: Primary | ICD-10-CM

## 2018-04-04 DIAGNOSIS — R22.0 LEFT FACIAL SWELLING: ICD-10-CM

## 2018-04-04 DIAGNOSIS — K08.89 PAIN, DENTAL: ICD-10-CM

## 2018-04-04 PROCEDURE — 99283 EMERGENCY DEPT VISIT LOW MDM: CPT | Mod: 25

## 2018-04-04 PROCEDURE — S0077 INJECTION, CLINDAMYCIN PHOSP: HCPCS | Performed by: REGISTERED NURSE

## 2018-04-04 PROCEDURE — 96372 THER/PROPH/DIAG INJ SC/IM: CPT

## 2018-04-04 PROCEDURE — 25000003 PHARM REV CODE 250: Performed by: REGISTERED NURSE

## 2018-04-04 RX ORDER — ACETAMINOPHEN AND CODEINE PHOSPHATE 300; 30 MG/1; MG/1
1 TABLET ORAL EVERY 6 HOURS PRN
Qty: 12 TABLET | Refills: 0 | Status: SHIPPED | OUTPATIENT
Start: 2018-04-04 | End: 2018-04-14

## 2018-04-04 RX ORDER — HYDROCODONE BITARTRATE AND ACETAMINOPHEN 10; 325 MG/1; MG/1
1 TABLET ORAL
Status: COMPLETED | OUTPATIENT
Start: 2018-04-04 | End: 2018-04-04

## 2018-04-04 RX ORDER — CLINDAMYCIN PHOSPHATE 150 MG/ML
600 INJECTION, SOLUTION INTRAVENOUS
Status: COMPLETED | OUTPATIENT
Start: 2018-04-04 | End: 2018-04-04

## 2018-04-04 RX ORDER — CLINDAMYCIN HYDROCHLORIDE 300 MG/1
300 CAPSULE ORAL EVERY 6 HOURS
Qty: 28 CAPSULE | Refills: 0 | Status: SHIPPED | OUTPATIENT
Start: 2018-04-04 | End: 2018-04-11

## 2018-04-04 RX ADMIN — CLINDAMYCIN PHOSPHATE 600 MG: 150 INJECTION, SOLUTION INTRAMUSCULAR; INTRAVENOUS at 05:04

## 2018-04-04 RX ADMIN — HYDROCODONE BITARTRATE AND ACETAMINOPHEN 1 TABLET: 10; 325 TABLET ORAL at 04:04
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
## 2018-04-04 NOTE — ED PROVIDER NOTES
History      Chief Complaint   Patient presents with    Dental Pain     left lower jaw pain secondary to dental caries - swelling and pain since last night       Review of patient's allergies indicates:   Allergen Reactions    Penicillins Other (See Comments)     Not sure        HPI   HPI    4/4/2018, 4:47 PM   History obtained from the patient      History of Present Illness: Jared Michael Cowden is a 28 y.o. male patient who presents to the Emergency Department for L lower dental pain and swelling which onset gradually last night. Symptoms are constant and moderate in severity. No mitigating or exacerbating factors reported. Associated sxs include painful chewing. Patient denies any fever, chills, difficulty swallowing, sore throat, and all other sxs at this time. Prior Tx includes nothing. No further complaints or concerns at this time.         Arrival mode: Personal vehicle      PCP: Lorenzo Morley MD       Past Medical History:  Past Medical History:   Diagnosis Date    Chronic pain     Neuropathy        Past Surgical History:  Past Surgical History:   Procedure Laterality Date    ANKLE SURGERY      Right    HIP SURGERY      PELVIC SYMPHYSIS FUSION           Family History:  History reviewed. No pertinent family history.    Social History:  Social History     Social History Main Topics    Smoking status: Former Smoker    Smokeless tobacco: Never Used    Alcohol use Yes    Drug use: No      Comment: pt denies     Sexual activity: Not on file       ROS   Review of Systems   Constitutional: Negative for fever.   HENT: Positive for dental problem. Negative for sore throat.         + L facial swelling  + Dental caries    Respiratory: Negative for shortness of breath.    Cardiovascular: Negative for chest pain.   Gastrointestinal: Negative for nausea.   Genitourinary: Negative for dysuria.   Musculoskeletal: Negative for back pain.   Skin: Negative for rash.   Neurological: Negative for weakness.  "  Hematological: Does not bruise/bleed easily.   All other systems reviewed and are negative.      Physical Exam      Initial Vitals   BP Pulse Resp Temp SpO2   -- -- -- -- --      MAP       --          Physical Exam  Nursing Notes and Vital Signs Reviewed.  Constitutional: Patient is in no acute distress. Well-developed and well-nourished.  Head: Atraumatic. Normocephalic.  Eyes: PERRL. EOM intact. Conjunctivae are not pale. No scleral icterus.  ENT: Mucous membranes are moist. Oropharynx is clear and symmetric.   Mouth/Throat:  L mandibular swelling. Patient handles secretions normally. Positive for dental caries. Positive for gingival edema. No palpable fluctuance. No evidence of periodontal or periapical abscess. No trismus.    Neck: Supple. Full ROM. No lymphadenopathy.  Cardiovascular: Regular rate. Regular rhythm. No murmurs, rubs, or gallops. Distal pulses are 2+ and symmetric.  Pulmonary/Chest: No respiratory distress. Clear to auscultation bilaterally. No wheezing or rales.  Abdominal: Soft and non-distended.  There is no tenderness.  No rebound, guarding, or rigidity. Good bowel sounds.  Genitourinary: No CVA tenderness  Musculoskeletal: Moves all extremities. No obvious deformities. No edema. No calf tenderness.  Skin: Warm and dry.  Neurological:  Alert, awake, and appropriate.  Normal speech.  No acute focal neurological deficits are appreciated.  Psychiatric: Normal affect. Good eye contact. Appropriate in content.    ED Course    Procedures  ED Vital Signs:  Vitals:    04/04/18 1625   BP: 120/70   Pulse: 72   Resp: 20   Temp: 98.3 °F (36.8 °C)   TempSrc: Oral   SpO2: 98%   Weight: 71.7 kg (158 lb)   Height: 5' 7" (1.702 m)       Abnormal Lab Results:  Labs Reviewed - No data to display     All Lab Results:      Imaging Results:  Imaging Results    None                 The Emergency Provider reviewed the vital signs and test results, which are outlined above.    ED Discussion     4:51 PM: Reassessed " pt at this time.  Pt states his condition has improved at this time. Discussed with pt all pertinent ED information and results. Discussed pt dx and plan of tx. Gave pt all f/u and return to the ED instructions. All questions and concerns were addressed at this time. Pt expresses understanding of information and instructions, and is comfortable with plan to discharge. Pt is stable for discharge.        ED Medication(s):  Medications   clindamycin injection 600 mg (600 mg Intramuscular Given 4/4/18 0241)   hydrocodone-acetaminophen 10-325mg per tablet 1 tablet (1 tablet Oral Given 4/4/18 6666)       Discharge Medication List as of 4/4/2018  4:52 PM      START taking these medications    Details   acetaminophen-codeine 300-30mg (TYLENOL #3) 300-30 mg Tab Take 1 tablet by mouth every 6 (six) hours as needed., Starting Wed 4/4/2018, Until Sat 4/14/2018, Print      clindamycin (CLEOCIN) 300 MG capsule Take 1 capsule (300 mg total) by mouth every 6 (six) hours., Starting Wed 4/4/2018, Until Wed 4/11/2018, Print             Follow-up Information     Lorenzo Morley MD In 3 days.    Specialty:  Family Medicine  Contact information:  [address] [facility]  [facility] [address] [phone]             [facility] In 2 days.    Specialty:  Emergency Medicine  Why:  If symptoms worsen  Contact information:  [address] [address] [address] [phone]                   Medical Decision Making                 Clinical Impression       ICD-10-CM ICD-9-CM   1. Dental infection K04.7 522.4   2. Pain, dental K08.89 525.9   3. Left facial swelling R22.0 784.2               Benjamin Nuñez Jr., FNP  04/04/18 4476
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
## 2018-11-20 ENCOUNTER — HOSPITAL ENCOUNTER (EMERGENCY)
Facility: HOSPITAL | Age: 28
Discharge: HOME | End: 2018-11-20
Attending: EMERGENCY MEDICINE
Payer: MEDICAID

## 2018-11-20 VITALS
BODY MASS INDEX: 24.1 KG/M2 | OXYGEN SATURATION: 98 % | WEIGHT: 153.88 LBS | HEART RATE: 109 BPM | TEMPERATURE: 98 F | SYSTOLIC BLOOD PRESSURE: 133 MMHG | RESPIRATION RATE: 18 BRPM | DIASTOLIC BLOOD PRESSURE: 84 MMHG

## 2018-11-20 DIAGNOSIS — M79.641 RIGHT HAND PAIN: Primary | ICD-10-CM

## 2018-11-20 DIAGNOSIS — M79.89 SWELLING OF RIGHT HAND: ICD-10-CM

## 2018-11-20 PROCEDURE — 99283 EMERGENCY DEPT VISIT LOW MDM: CPT | Mod: 25

## 2018-11-20 RX ORDER — IBUPROFEN 600 MG/1
600 TABLET ORAL EVERY 6 HOURS PRN
Qty: 30 TABLET | Refills: 0 | Status: SHIPPED | OUTPATIENT
Start: 2018-11-20 | End: 2021-03-05

## 2018-11-21 NOTE — ED PROVIDER NOTES
"Encounter Date: 11/20/2018       History     Chief Complaint   Patient presents with    Hand Pain     Right hand, pt states started swelling when cold weather came. Hx arthritis      The history is provided by the patient.   Hand Injury    The incident occurred several days ago. The injury mechanism is unknown. The pain is present in the right hand. The quality of the pain is described as aching. The pain is at a severity of 6/10. The pain has been constant since the incident. Pertinent negatives include no fever and no malaise/fatigue. The symptoms are aggravated by movement, use and palpation. He has tried NSAIDs (last dose was this morning) for the symptoms. The treatment provided moderate relief.   Mr. Cowden presents to the emergency department with complaints of pain and swelling to right hand.  He states that the pain began approximately two weeks ago after "helping someone  something heavy" but denies any known trauma.  He states that he does have a history of arthritis and believes that his hand swelling "started when it got cold".  He denies any further complaints or concerns.       PCP:    Lorenzo Morley MD        Review of patient's allergies indicates:   Allergen Reactions    Penicillins Other (See Comments)     Not sure     Past Medical History:   Diagnosis Date    Arthritis     Chronic pain     Neuropathy      Past Surgical History:   Procedure Laterality Date    ANKLE SURGERY      Right    HIP SURGERY      PELVIC SYMPHYSIS FUSION       History reviewed. No pertinent family history.  Social History     Tobacco Use    Smoking status: Former Smoker    Smokeless tobacco: Never Used   Substance Use Topics    Alcohol use: Yes    Drug use: No     Comment: pt denies      Review of Systems   Constitutional: Negative for chills, fever and malaise/fatigue.   HENT: Negative for congestion and sore throat.    Respiratory: Negative for cough, chest tightness, shortness of breath and wheezing.  "   Cardiovascular: Negative for chest pain and palpitations.   Gastrointestinal: Negative for abdominal pain, diarrhea, nausea and vomiting.   Genitourinary: Negative for dysuria.   Musculoskeletal: Positive for arthralgias (right hand). Negative for back pain and neck pain.   Skin: Negative for rash.   Neurological: Negative for dizziness, weakness, light-headedness and headaches.   Hematological: Does not bruise/bleed easily.       Physical Exam     Initial Vitals [11/20/18 1759]   BP Pulse Resp Temp SpO2   136/88 (!) 115 20 97.7 °F (36.5 °C) 100 %      MAP       --         Physical Exam    Nursing note and vitals reviewed.  Constitutional: He appears well-developed and well-nourished. He is cooperative. He does not appear ill. No distress.   HENT:   Head: Normocephalic and atraumatic.   Right Ear: External ear normal.   Left Ear: External ear normal.   Nose: Nose normal.   Mouth/Throat: Uvula is midline, oropharynx is clear and moist and mucous membranes are normal.   Eyes: Conjunctivae, EOM and lids are normal. Pupils are equal, round, and reactive to light.   Neck: Trachea normal and normal range of motion. Neck supple.   Cardiovascular: Normal rate, regular rhythm, intact distal pulses and normal pulses.   Pulmonary/Chest: Effort normal. No accessory muscle usage. No respiratory distress.   Musculoskeletal: Normal range of motion. He exhibits no edema.        Right hand: He exhibits tenderness (generalized tenderness to right hand with point tenderness present near 3rd-5th metacarpals - neurovascular intact - mild swelling also noted - no deformity or obvious trauma noted) and swelling.   Neurological: He is alert and oriented to person, place, and time. He has normal strength. Gait normal. GCS eye subscore is 4. GCS verbal subscore is 5. GCS motor subscore is 6.   Neurovascular intact to all extremities.    Skin: Skin is warm, dry and intact. Capillary refill takes less than 2 seconds. No abrasion, no  "bruising, no ecchymosis and no rash noted.   Psychiatric: He has a normal mood and affect. His speech is normal and behavior is normal. Cognition and memory are normal.         ED Course   Splint Application  Date/Time: 2018 6:50 PM  Performed by: Harshad Melton NP  Authorized by: Harshad Melton NP   Consent Done: Yes  Consent: Verbal consent obtained.  Risks and benefits: risks, benefits and alternatives were discussed  Consent given by: patient  Imaging studies: imaging studies available  Patient identity confirmed: , MRN, name and verbally with patient  Time out: Immediately prior to procedure a "time out" was called to verify the correct patient, procedure, equipment, support staff and site/side marked as required.  Location details: right hand  Splint type: ace bandage.  Supplies used: elastic bandage (ace bandage)  Post-procedure: The splinted body part was neurovascularly unchanged following the procedure.  Patient tolerance: Patient tolerated the procedure well with no immediate complications             ED Imaging Results:   Imaging Results          X-Ray Hand 3 view Right (Final result)  Result time 18 18:50:59    Final result by Yaya Rosales III, MD (18 18:50:59)                 Impression:      Grossly negative right hand series.      Electronically signed by: Yaya Rosales MD  Date:    2018  Time:    18:50             Narrative:    EXAMINATION:  XR HAND COMPLETE 3 VIEW RIGHT    CLINICAL HISTORY:  Right Hand Pain & Swelling;    COMPARISON:  None    FINDINGS:  No acute fracture or dislocation.  No lytic or blastic change.  No radiopaque foreign body.                                ED Course Vitals  Vitals:    18 1759 18 1919   BP: 136/88 133/84   BP Location: Left arm Left arm   Patient Position: Sitting Sitting   Pulse: (!) 115 109   Resp:  18   Temp: 97.7 °F (36.5 °C)    TempSrc: Oral    SpO2: 100% 98%   Weight: 69.8 kg (153 lb 14.1 oz)  "         1855 HOURS RE-EVALUATION & DISPOSITION:   Reassessment at the time of disposition demonstrates that the patient is resting comfortably in no acute distress.  He has remained hemodynamically stable throughout the entire ED visit and is without objective evidence for acute process requiring urgent intervention or hospitalization. I discussed test results and provided counseling to patient with regard to condition, the treatment plan, specific conditions for return, and the importance of follow up.  Answered questions at this time. The patient is stable for discharge.         X-Rays:   Independently Interpreted Readings:   Other Readings:  Radiographs of the right hand reveal no acute findings.     Medical Decision Making:   History:   Old Records Summarized: records from clinic visits.  Clinical Tests:   Radiological Study: Ordered and Reviewed                      Clinical Impression:       ICD-10-CM ICD-9-CM   1. Right hand pain M79.641 729.5   2. Swelling of right hand M79.89 729.81           Disposition:   Disposition: Discharged  Condition: Stable  I discussed with patient that the evaluation in the emergency department does not suggest any emergent or life threatening medical condition requiring immediate intervention beyond what was provided in the ED, and I believe patient is safe for discharge.  Regardless, an unremarkable evaluation in the ED does not preclude the development or presence of a serious of life threatening condition. As such, patient was instructed to return immediately for any worsening or change in current symptoms. I also discussed the results of my evaluation and diagnostics with patient and he concurs with the evaluation and management plan.  Detailed written and verbal instructions provided to patient and he expressed a verbal understanding of the discharge instructions and overall management plan. Reiterated the importance of medication administration and safety and advised  patient to follow up with primary care provider in 3-5 days or sooner if needed.  Also advised patient to return to the ER for any complications.     Regarding RIGHT HAND PAIN, the patient was advised to participate in low-impact aerobic activity, perform range of motion exercises for improved flexibility, increase muscle tone by strength training, application of heat or ice, get plenty of sleep,  and avoid staying in one position for an extended period. Patient was encouraged to eat healthy diet full of fruits and vegetables, foods rich in omega-3 fatty acids, maintain/obtain healthy weight, and to take medications as prescribed.          Follow-up Information     Schedule an appointment as soon as possible for a visit  with Lorenzo Morley MD.    Specialty:  Family Medicine  Contact information:  [address] [facility]   [place] [address] [phone]             Go to  [facility].    Why:  As needed  Contact information:  [address] [facility] [address]                      PRESCRIPTIONS:       START taking these medications    ibuprofen 600 MG tablet  Commonly known as:  ADVIL,MOTRIN  Take 1 tablet (600 mg total) by mouth every 6 (six) hours as needed for Pain.          Harshad Melton NP  11/20/18 1263

## 2019-03-27 ENCOUNTER — HOSPITAL ENCOUNTER (EMERGENCY)
Facility: HOSPITAL | Age: 29
Discharge: HOME | End: 2019-03-27
Attending: EMERGENCY MEDICINE
Payer: MEDICAID

## 2019-03-27 VITALS
TEMPERATURE: 98 F | SYSTOLIC BLOOD PRESSURE: 145 MMHG | RESPIRATION RATE: 20 BRPM | HEART RATE: 98 BPM | HEIGHT: 68 IN | DIASTOLIC BLOOD PRESSURE: 88 MMHG | WEIGHT: 155 LBS | BODY MASS INDEX: 23.49 KG/M2 | OXYGEN SATURATION: 100 %

## 2019-03-27 DIAGNOSIS — F15.10 METHAMPHETAMINE ABUSE: Primary | ICD-10-CM

## 2019-03-27 DIAGNOSIS — F11.10 OPIATE ABUSE, CONTINUOUS: ICD-10-CM

## 2019-03-27 LAB
ALBUMIN SERPL BCP-MCNC: 4.3 G/DL (ref 3.5–5.2)
ALP SERPL-CCNC: 93 U/L (ref 55–135)
ALT SERPL W/O P-5'-P-CCNC: 18 U/L (ref 10–44)
AMPHET+METHAMPHET UR QL: NORMAL
AMPHET+METHAMPHET UR QL: NORMAL
ANION GAP SERPL CALC-SCNC: 8 MMOL/L (ref 8–16)
AST SERPL-CCNC: 26 U/L (ref 10–40)
BARBITURATES UR QL SCN>200 NG/ML: NEGATIVE
BARBITURATES UR QL SCN>200 NG/ML: NEGATIVE
BASOPHILS # BLD AUTO: 0.02 K/UL (ref 0–0.2)
BASOPHILS NFR BLD: 0.2 % (ref 0–1.9)
BENZODIAZ UR QL SCN>200 NG/ML: NEGATIVE
BENZODIAZ UR QL SCN>200 NG/ML: NEGATIVE
BILIRUB SERPL-MCNC: 0.3 MG/DL (ref 0.1–1)
BILIRUB UR QL STRIP: NEGATIVE
BUN SERPL-MCNC: 13 MG/DL (ref 6–20)
BZE UR QL SCN: NEGATIVE
BZE UR QL SCN: NEGATIVE
CALCIUM SERPL-MCNC: 9.6 MG/DL (ref 8.7–10.5)
CANNABINOIDS UR QL SCN: NEGATIVE
CANNABINOIDS UR QL SCN: NEGATIVE
CHLORIDE SERPL-SCNC: 104 MMOL/L (ref 95–110)
CLARITY UR REFRACT.AUTO: CLEAR
CO2 SERPL-SCNC: 27 MMOL/L (ref 23–29)
COLOR UR AUTO: YELLOW
CREAT SERPL-MCNC: 0.9 MG/DL (ref 0.5–1.4)
CREAT UR-MCNC: 35.8 MG/DL (ref 23–375)
CREAT UR-MCNC: 35.8 MG/DL (ref 23–375)
DIFFERENTIAL METHOD: NORMAL
EOSINOPHIL # BLD AUTO: 0.2 K/UL (ref 0–0.5)
EOSINOPHIL NFR BLD: 1.7 % (ref 0–8)
ERYTHROCYTE [DISTWIDTH] IN BLOOD BY AUTOMATED COUNT: 12.6 % (ref 11.5–14.5)
EST. GFR  (AFRICAN AMERICAN): >60 ML/MIN/1.73 M^2
EST. GFR  (NON AFRICAN AMERICAN): >60 ML/MIN/1.73 M^2
ETHANOL SERPL-MCNC: <10 MG/DL
GLUCOSE SERPL-MCNC: 102 MG/DL (ref 70–110)
GLUCOSE UR QL STRIP: NEGATIVE
HCT VFR BLD AUTO: 41.4 % (ref 40–54)
HGB BLD-MCNC: 14.1 G/DL (ref 14–18)
HGB UR QL STRIP: NEGATIVE
KETONES UR QL STRIP: NEGATIVE
LEUKOCYTE ESTERASE UR QL STRIP: NEGATIVE
LYMPHOCYTES # BLD AUTO: 1.7 K/UL (ref 1–4.8)
LYMPHOCYTES NFR BLD: 18.9 % (ref 18–48)
MCH RBC QN AUTO: 29.3 PG (ref 27–31)
MCHC RBC AUTO-ENTMCNC: 34.1 G/DL (ref 32–36)
MCV RBC AUTO: 86 FL (ref 82–98)
METHADONE UR QL SCN>300 NG/ML: NORMAL
METHADONE UR QL SCN>300 NG/ML: NORMAL
MONOCYTES # BLD AUTO: 0.8 K/UL (ref 0.3–1)
MONOCYTES NFR BLD: 8.5 % (ref 4–15)
NEUTROPHILS # BLD AUTO: 6.2 K/UL (ref 1.8–7.7)
NEUTROPHILS NFR BLD: 70.7 % (ref 38–73)
NITRITE UR QL STRIP: NEGATIVE
OPIATES UR QL SCN: NEGATIVE
OPIATES UR QL SCN: NEGATIVE
PCP UR QL SCN>25 NG/ML: NEGATIVE
PCP UR QL SCN>25 NG/ML: NEGATIVE
PH UR STRIP: 7 [PH] (ref 5–8)
PLATELET # BLD AUTO: 333 K/UL (ref 150–350)
PMV BLD AUTO: 10.2 FL (ref 9.2–12.9)
POTASSIUM SERPL-SCNC: 3.5 MMOL/L (ref 3.5–5.1)
PROT SERPL-MCNC: 7.4 G/DL (ref 6–8.4)
PROT UR QL STRIP: NEGATIVE
RBC # BLD AUTO: 4.82 M/UL (ref 4.6–6.2)
SODIUM SERPL-SCNC: 139 MMOL/L (ref 136–145)
SP GR UR STRIP: <=1.005 (ref 1–1.03)
TOXICOLOGY INFORMATION: NORMAL
TOXICOLOGY INFORMATION: NORMAL
URN SPEC COLLECT METH UR: ABNORMAL
UROBILINOGEN UR STRIP-ACNC: NEGATIVE EU/DL
WBC # BLD AUTO: 8.78 K/UL (ref 3.9–12.7)

## 2019-03-27 PROCEDURE — 93005 ELECTROCARDIOGRAM TRACING: CPT | Mod: ER

## 2019-03-27 PROCEDURE — 80320 DRUG SCREEN QUANTALCOHOLS: CPT | Mod: ER

## 2019-03-27 PROCEDURE — 81003 URINALYSIS AUTO W/O SCOPE: CPT | Mod: ER,59

## 2019-03-27 PROCEDURE — 93010 EKG 12-LEAD: ICD-10-PCS | Mod: ,,, | Performed by: INTERNAL MEDICINE

## 2019-03-27 PROCEDURE — 99285 EMERGENCY DEPT VISIT HI MDM: CPT | Mod: ER

## 2019-03-27 PROCEDURE — 80053 COMPREHEN METABOLIC PANEL: CPT | Mod: ER

## 2019-03-27 PROCEDURE — 80307 DRUG TEST PRSMV CHEM ANLYZR: CPT | Mod: ER

## 2019-03-27 PROCEDURE — 93010 ELECTROCARDIOGRAM REPORT: CPT | Mod: ,,, | Performed by: INTERNAL MEDICINE

## 2019-03-27 PROCEDURE — 99900035 HC TECH TIME PER 15 MIN (STAT): Mod: ER

## 2019-03-27 PROCEDURE — 85025 COMPLETE CBC W/AUTO DIFF WBC: CPT | Mod: ER

## 2019-03-27 NOTE — ED PROVIDER NOTES
"Encounter Date: 3/27/2019       History     Chief Complaint   Patient presents with    Anxiety     Has had a pint of Kimberlyna tonight (reports he is an alcoholic) & "I saw something on the floor & put it in mouth & I think it was glass & I spIt it out"  Since then he has been anxious & SOB     Patient currently presents with concern regarding anxiety.  Patient notes that he found something in a paper bag near the landing that he started taking by mouth.  Patient believes that it was methamphetamines.  Patient denies IV drug abuse.  Patient notes ongoing insomnia, anxiety, and anorexia.  Denies vomiting or diarrhea.  Denies fevers or chills.        Review of patient's allergies indicates:   Allergen Reactions    Penicillins Other (See Comments)     Not sure     Past Medical History:   Diagnosis Date    Anxiety about health     Arthritis     Chronic pain     ETOH abuse     Neuropathy      Past Surgical History:   Procedure Laterality Date    ANKLE SURGERY      Right    HIP SURGERY      PELVIC SYMPHYSIS FUSION       History reviewed. No pertinent family history.  Social History     Tobacco Use    Smoking status: Former Smoker    Smokeless tobacco: Never Used   Substance Use Topics    Alcohol use: Yes     Comment: daily alcohol intake    Drug use: No     Comment: pt denies      Review of Systems   Constitutional: Positive for appetite change. Negative for chills and fever.   HENT: Negative for congestion.    Respiratory: Negative for chest tightness and shortness of breath.    Cardiovascular: Negative for chest pain and leg swelling.   Gastrointestinal: Negative for abdominal pain, constipation, diarrhea, nausea and vomiting.   Genitourinary: Negative for dysuria, frequency and urgency.   Skin: Negative for color change and rash.   Allergic/Immunologic: Negative for immunocompromised state.   Neurological: Negative for weakness and numbness.   Hematological: Negative for adenopathy. Does not bruise/bleed " easily.   Psychiatric/Behavioral: Positive for dysphoric mood and sleep disturbance. Negative for confusion, hallucinations, self-injury and suicidal ideas. The patient is nervous/anxious and is hyperactive.    All other systems reviewed and are negative.      Physical Exam     Initial Vitals [03/27/19 0131]   BP Pulse Resp Temp SpO2   (!) 167/92 (!) 122 (!) 22 98.3 °F (36.8 °C) 100 %      MAP       --         Physical Exam    Nursing note and vitals reviewed.  Constitutional: He appears well-developed and well-nourished. He is not diaphoretic. No distress.   HENT:   Head: Normocephalic and atraumatic.   Right Ear: External ear normal.   Left Ear: External ear normal.   Nose: Nose normal.   Mouth/Throat: Oropharynx is clear and moist.   Eyes: Conjunctivae and EOM are normal. Pupils are equal, round, and reactive to light. No scleral icterus.   Neck: Neck supple. No JVD present.   Cardiovascular: Normal rate, regular rhythm, normal heart sounds and intact distal pulses. Exam reveals no gallop and no friction rub.    No murmur heard.  Pulmonary/Chest: Breath sounds normal. No respiratory distress. He has no wheezes. He has no rhonchi. He has no rales.   Abdominal: Soft. Bowel sounds are normal. He exhibits no distension. There is no tenderness.   Musculoskeletal: Normal range of motion. He exhibits no edema.   Neurological: He is alert and oriented to person, place, and time. He has normal strength. GCS score is 15. GCS eye subscore is 4. GCS verbal subscore is 5. GCS motor subscore is 6.   Skin: Skin is warm and dry. No rash noted.   Psychiatric: His mood appears anxious. His speech is rapid and/or pressured. He is hyperactive. Cognition and memory are normal.         ED Course   Procedures  Labs Reviewed   URINALYSIS, REFLEX TO URINE CULTURE - Abnormal; Notable for the following components:       Result Value    Specific Gravity, UA <=1.005 (*)     All other components within normal limits    Narrative:      Preferred Collection Type->Urine, Clean Catch   DRUG SCREEN PANEL, URINE EMERGENCY    Narrative:     Preferred Collection Type->Urine, Clean Catch   CBC W/ AUTO DIFFERENTIAL   COMPREHENSIVE METABOLIC PANEL   ALCOHOL,MEDICAL (ETHANOL)   DRUG SCREEN PANEL, URINE EMERGENCY    Narrative:     Preferred Collection Type->Urine, Clean Catch     EKG Readings: (Independently Interpreted)   Initial Reading: No STEMI. Rhythm: Sinus Tachycardia. Heart Rate: 117. Ectopy: No Ectopy. Conduction: Normal. Axis: Normal.     ECG Results          EKG 12-lead (Final result)  Result time 03/27/19 20:00:01    Final result by Interface, Lab In [facility] (03/27/19 20:00:01)                 Narrative:    Test Reason :     Vent. Rate : 117 BPM     Atrial Rate : 117 BPM     P-R Int : 146 ms          QRS Dur : 084 ms      QT Int : 318 ms       P-R-T Axes : 070 059 053 degrees     QTc Int : 443 ms    Sinus tachycardia  Otherwise normal ECG  When compared with ECG of 22-JUL-2017 21:01,  No significant change was found  Confirmed by GILLIAN RICHTER ([id number]) on 3/27/2019 7:59:49 PM    Referred By: AAAREFTORRIE   SELF           Confirmed By:GILLIAN RICHTER                            Imaging Results          X-Ray Chest AP Portable (Final result)  Result time 03/27/19 08:00:57    Final result by Yaya Farnsworth MD (03/27/19 08:00:57)                 Impression:      No acute process seen.      Electronically signed by: Yaya Farnsworth MD  Date:    03/27/2019  Time:    08:00             Narrative:    EXAMINATION:  XR CHEST AP PORTABLE    CLINICAL HISTORY:  Sinus tachycardia;    FINDINGS:  Single view of the chest.    Cardiac silhouette is normal.  The lungs demonstrate no evidence of active disease.  No evidence of pleural effusion or pneumothorax.  Bones appear intact.                                 Medical Decision Making:   ED Management:  All findings were reviewed with the patient/family in detail along with the diagnosis of methamphetamine and opiate abuse.   Patient counseled regarding the ongoing risks associated with drug abuse.  I have counseled both the patient and his accompanying grandfather regarding recommendations to pursue substance abuse rehabilitation a since possible.  They have been provided with written discharge instructions which detail contact information for such facilities.  I see no indication of an emergent process beyond that addressed during our encounter but have duly counseled the patient/family regarding the need for prompt follow-up as well as the indications that should prompt immediate return to the emergency room should new or worrisome developments occur.  The patient/family communicates understanding of all this information and all remaining questions and concerns were addressed at this time.                          Clinical Impression:       ICD-10-CM ICD-9-CM   1. Methamphetamine abuse F15.10 305.70   2. Opiate abuse, continuous F11.10 305.51                                Flavio Oneill MD  03/28/19 4525

## 2019-11-18 ENCOUNTER — HISTORICAL (OUTPATIENT)
Dept: ADMINISTRATIVE | Facility: HOSPITAL | Age: 29
End: 2019-11-18

## 2020-01-02 ENCOUNTER — HISTORICAL (OUTPATIENT)
Dept: INTERNAL MEDICINE | Facility: CLINIC | Age: 30
End: 2020-01-02

## 2020-01-02 LAB
ABS NEUT (OLG): 3.32 X10(3)/MCL (ref 2.1–9.2)
ALBUMIN SERPL-MCNC: 4.2 GM/DL (ref 3.4–5)
ALBUMIN/GLOB SERPL: 1.2 RATIO (ref 1.1–2)
ALP SERPL-CCNC: 88 UNIT/L (ref 45–117)
ALT SERPL-CCNC: 35 UNIT/L (ref 12–78)
APPEARANCE, UA: CLEAR
AST SERPL-CCNC: 37 UNIT/L (ref 15–37)
BACTERIA #/AREA URNS AUTO: ABNORMAL /HPF
BASOPHILS # BLD AUTO: 0 X10(3)/MCL (ref 0–0.2)
BASOPHILS NFR BLD AUTO: 1 %
BILIRUB SERPL-MCNC: 0.4 MG/DL (ref 0.2–1)
BILIRUB UR QL STRIP: NEGATIVE
BILIRUBIN DIRECT+TOT PNL SERPL-MCNC: 0.1 MG/DL (ref 0–0.2)
BILIRUBIN DIRECT+TOT PNL SERPL-MCNC: 0.3 MG/DL
BUN SERPL-MCNC: 13 MG/DL (ref 7–18)
CALCIUM SERPL-MCNC: 8.8 MG/DL (ref 8.5–10.1)
CHLORIDE SERPL-SCNC: 107 MMOL/L (ref 98–107)
CHOLEST SERPL-MCNC: 179 MG/DL
CHOLEST/HDLC SERPL: 3.1 {RATIO} (ref 0–5)
CO2 SERPL-SCNC: 28 MMOL/L (ref 21–32)
COLOR UR: NORMAL
CREAT SERPL-MCNC: 1.2 MG/DL (ref 0.6–1.3)
EOSINOPHIL # BLD AUTO: 0.2 X10(3)/MCL (ref 0–0.9)
EOSINOPHIL NFR BLD AUTO: 3 %
ERYTHROCYTE [DISTWIDTH] IN BLOOD BY AUTOMATED COUNT: 12.1 % (ref 11.5–14.5)
EST. AVERAGE GLUCOSE BLD GHB EST-MCNC: 154 MG/DL
GLOBULIN SER-MCNC: 3.4 GM/ML (ref 2.3–3.5)
GLUCOSE (UA): NEGATIVE
GLUCOSE SERPL-MCNC: 96 MG/DL (ref 74–106)
HAV IGM SERPL QL IA: NONREACTIVE
HBA1C MFR BLD: 7 % (ref 4.2–6.3)
HBV CORE IGM SERPL QL IA: NONREACTIVE
HBV SURFACE AG SERPL QL IA: NEGATIVE
HCT VFR BLD AUTO: 41.9 % (ref 40–51)
HCV AB SERPL QL IA: NONREACTIVE
HDLC SERPL-MCNC: 57 MG/DL (ref 40–59)
HGB BLD-MCNC: 14.1 GM/DL (ref 13.5–17.5)
HGB UR QL STRIP: NEGATIVE
HIV 1+2 AB+HIV1 P24 AG SERPL QL IA: NONREACTIVE
HYALINE CASTS #/AREA URNS LPF: ABNORMAL /LPF
IMM GRANULOCYTES # BLD AUTO: 0.02 10*3/UL
IMM GRANULOCYTES NFR BLD AUTO: 0 %
KETONES UR QL STRIP: NEGATIVE
LDLC SERPL CALC-MCNC: 112 MG/DL
LEUKOCYTE ESTERASE UR QL STRIP: NEGATIVE
LYMPHOCYTES # BLD AUTO: 1.6 X10(3)/MCL (ref 0.6–4.6)
LYMPHOCYTES NFR BLD AUTO: 28 %
MCH RBC QN AUTO: 29.5 PG (ref 26–34)
MCHC RBC AUTO-ENTMCNC: 33.7 GM/DL (ref 31–37)
MCV RBC AUTO: 87.7 FL (ref 80–100)
MONOCYTES # BLD AUTO: 0.3 X10(3)/MCL (ref 0.1–1.3)
MONOCYTES NFR BLD AUTO: 6 %
NEUTROPHILS # BLD AUTO: 3.32 X10(3)/MCL (ref 2.1–9.2)
NEUTROPHILS NFR BLD AUTO: 61 %
NITRITE UR QL STRIP: NEGATIVE
PH UR STRIP: 7.5 [PH] (ref 4.5–8)
PLATELET # BLD AUTO: 283 X10(3)/MCL (ref 130–400)
PMV BLD AUTO: 10 FL (ref 7.4–10.4)
POTASSIUM SERPL-SCNC: 3.8 MMOL/L (ref 3.5–5.1)
PROT SERPL-MCNC: 7.6 GM/DL (ref 6.4–8.2)
PROT UR QL STRIP: NEGATIVE
RBC # BLD AUTO: 4.78 X10(6)/MCL (ref 4.5–5.9)
RBC #/AREA URNS AUTO: ABNORMAL /HPF
SODIUM SERPL-SCNC: 140 MMOL/L (ref 136–145)
SP GR UR STRIP: 1.01 (ref 1–1.03)
SQUAMOUS #/AREA URNS LPF: ABNORMAL /LPF
T PALLIDUM AB SER QL: NONREACTIVE
TRIGL SERPL-MCNC: 49 MG/DL
TSH SERPL-ACNC: 1.58 MIU/L (ref 0.36–3.74)
UROBILINOGEN UR STRIP-ACNC: NORMAL
VLDLC SERPL CALC-MCNC: 10 MG/DL
WBC # SPEC AUTO: 5.4 X10(3)/MCL (ref 4.5–11)
WBC #/AREA URNS AUTO: ABNORMAL /HPF

## 2020-11-29 ENCOUNTER — HOSPITAL ENCOUNTER (EMERGENCY)
Facility: HOSPITAL | Age: 30
Discharge: HOME | End: 2020-11-29
Attending: EMERGENCY MEDICINE
Payer: MEDICAID

## 2020-11-29 VITALS
BODY MASS INDEX: 26.61 KG/M2 | DIASTOLIC BLOOD PRESSURE: 89 MMHG | SYSTOLIC BLOOD PRESSURE: 141 MMHG | TEMPERATURE: 99 F | RESPIRATION RATE: 20 BRPM | HEART RATE: 96 BPM | OXYGEN SATURATION: 99 % | WEIGHT: 175 LBS

## 2020-11-29 DIAGNOSIS — T50.905A MEDICATION ADVERSE EFFECT, INITIAL ENCOUNTER: Primary | ICD-10-CM

## 2020-11-29 DIAGNOSIS — R42 DIZZINESS: ICD-10-CM

## 2020-11-29 PROCEDURE — 93005 ELECTROCARDIOGRAM TRACING: CPT | Mod: ER

## 2020-11-29 PROCEDURE — 99283 EMERGENCY DEPT VISIT LOW MDM: CPT | Mod: 25,ER

## 2020-11-29 PROCEDURE — 93010 EKG 12-LEAD: ICD-10-PCS | Mod: ,,, | Performed by: INTERNAL MEDICINE

## 2020-11-29 PROCEDURE — 93010 ELECTROCARDIOGRAM REPORT: CPT | Mod: ,,, | Performed by: INTERNAL MEDICINE

## 2020-11-29 NOTE — ED PROVIDER NOTES
"Encounter Date: 11/29/2020       History     Chief Complaint   Patient presents with    Numbness     states "tried to take risperdal, but took viagra instead" c/o numbness, warmth and tightness to right side of head and bilateral legs     Ruperto Michael Cowden is a 30 y.o. male who presents with accidentally ingesting one Viagra pill instead of one Risperdal pill just prior to arrival, followed by sudden onset, improving, moderate dizziness and sensation of numbness of his head and bilateral lower extremities at home. He hasn't had Viagra in >1 yr as the pills were old. He states he hadn't felt this way in the past with Viagra.  He has no other complaints.   He has had no prior episodes.          Review of patient's allergies indicates:   Allergen Reactions    Penicillins Other (See Comments)     Not sure     Past Medical History:   Diagnosis Date    Anxiety about health     Arthritis     Chronic pain     ETOH abuse     Neuropathy      Past Surgical History:   Procedure Laterality Date    ANKLE SURGERY      Right    HIP SURGERY      PELVIC SYMPHYSIS FUSION       History reviewed. No pertinent family history.  Social History     Tobacco Use    Smoking status: Current Every Day Smoker     Packs/day: 1.00     Types: Cigarettes    Smokeless tobacco: Never Used   Substance Use Topics    Alcohol use: Yes     Comment: daily alcohol intake    Drug use: No     Comment: pt denies      Review of Systems   Constitutional: Negative.  Negative for fever.   HENT: Negative.    Eyes: Negative.    Respiratory: Negative.  Negative for shortness of breath.    Cardiovascular: Negative for chest pain.   Gastrointestinal: Negative.    Endocrine: Negative.    Genitourinary: Negative.    Musculoskeletal: Negative.    Skin: Negative.    Allergic/Immunologic: Negative.    Neurological: Positive for dizziness, light-headedness and numbness. Negative for syncope and speech difficulty.   Hematological: Negative.  "   Psychiatric/Behavioral: Negative.    All other systems reviewed and are negative.      Physical Exam     Initial Vitals [11/29/20 0504]   BP Pulse Resp Temp SpO2   (!) 144/89 98 20 98.9 °F (37.2 °C) 98 %      MAP       --         Physical Exam    Nursing note and vitals reviewed.  Constitutional: He appears well-developed and well-nourished. No distress.   HENT:   Head: Normocephalic and atraumatic.   Eyes: Conjunctivae and EOM are normal. Pupils are equal, round, and reactive to light.   Neck: Neck supple.   Cardiovascular: Normal rate, regular rhythm, normal heart sounds and intact distal pulses.   Pulmonary/Chest: Breath sounds normal. No respiratory distress.   Abdominal: Soft. He exhibits no distension. There is no abdominal tenderness.   Musculoskeletal: Normal range of motion. No edema.   Neurological: He is alert and oriented to person, place, and time. He has normal strength. He displays normal reflexes. No cranial nerve deficit or sensory deficit. GCS score is 15. GCS eye subscore is 4. GCS verbal subscore is 5. GCS motor subscore is 6.   No pronator drift, no dysmetria   Skin: Skin is warm and dry. Capillary refill takes less than 2 seconds.   Psychiatric: He has a normal mood and affect. His behavior is normal. Judgment and thought content normal.         ED Course   Procedures  Labs Reviewed - No data to display  EKG Readings: (Independently Interpreted)   EKG Interpretation by Emergency Physician:   Rhythm: normal sinus.    Rate: 86 bpm.   No ST-T changes concerning for acute ischemia.   Normal axis.    Normal intervals.             Imaging Results    None             Additional MDM:     NIH Stroke Scale:   Interval = baseline (upon arrival/admit)  Level of consciousness = 0 - alert  LOC questions = 0 - answers both correctly  LOC commands = 0 - performs both correctly  Best gaze = 0 - normal  Visual = 0 - no visual loss  Facial palsy = 0 - normal  Motor left arm =  0 - no drift  Motor right arm =  0  - no drift  Motor left leg = 0 - no drift  Motor right leg =  0 - no drift  Limb ataxia = 0 - absent  Sensory = 0 - normal  Best language = 0 - no aphasia  Dysarthria = 0 - normal articulation  Extinction and inattention = 0 - no neglect  NIH Stroke Scale Total = 0                       No results found for this or any previous visit (from the past 24 hour(s)).    Medications - No data to display  I discussed with patient and/or family/caretaker that evaluation in the ED does not suggest any emergent or life threatening medical conditions requiring immediate intervention beyond what was provided in the ED, and I believe patient is safe for discharge.  Regardless, an unremarkable evaluation in the ED does not preclude the development or presence of a serious of life threatening condition. As such, patient was instructed to return immediately for any worsening or change in current symptoms.      Clinical Impression:       ICD-10-CM ICD-9-CM   1. Medication adverse effect, initial encounter  T50.905A E947.9   2. Dizziness  R42 780.4                          ED Disposition Condition    Discharge Stable        ED Prescriptions     None        Follow-up Information     Follow up With Specialties Details Why Contact Info    Lorenzo Morley MD Family Medicine In 1 week  [address]   [place]  [facility] [address]  [phone]      [facility] Emergency Medicine  As needed, If symptoms worsen [address] [place] [id number] [phone]                                       Lex Newton MD  11/29/20 0514

## 2021-02-10 ENCOUNTER — HOSPITAL ENCOUNTER (EMERGENCY)
Facility: HOSPITAL | Age: 31
Discharge: HOME | End: 2021-02-10
Payer: MEDICAID

## 2021-02-10 VITALS
SYSTOLIC BLOOD PRESSURE: 133 MMHG | RESPIRATION RATE: 20 BRPM | TEMPERATURE: 98 F | HEART RATE: 113 BPM | DIASTOLIC BLOOD PRESSURE: 80 MMHG | OXYGEN SATURATION: 98 % | WEIGHT: 187.75 LBS | BODY MASS INDEX: 28.54 KG/M2

## 2021-02-10 DIAGNOSIS — R11.2 NON-INTRACTABLE VOMITING WITH NAUSEA, UNSPECIFIED VOMITING TYPE: Primary | ICD-10-CM

## 2021-02-10 LAB
ALBUMIN SERPL BCP-MCNC: 4.3 G/DL (ref 3.5–5.2)
ALP SERPL-CCNC: 63 U/L (ref 55–135)
ALT SERPL W/O P-5'-P-CCNC: 68 U/L (ref 10–44)
AMORPH CRY UR QL COMP ASSIST: NORMAL
AMPHET+METHAMPHET UR QL: NORMAL
ANION GAP SERPL CALC-SCNC: 7 MMOL/L (ref 8–16)
AST SERPL-CCNC: 110 U/L (ref 10–40)
BARBITURATES UR QL SCN>200 NG/ML: NEGATIVE
BASOPHILS # BLD AUTO: 0.04 K/UL (ref 0–0.2)
BASOPHILS NFR BLD: 0.5 % (ref 0–1.9)
BENZODIAZ UR QL SCN>200 NG/ML: NEGATIVE
BILIRUB SERPL-MCNC: 0.8 MG/DL (ref 0.1–1)
BILIRUB UR QL STRIP: NEGATIVE
BUN SERPL-MCNC: 11 MG/DL (ref 6–20)
BZE UR QL SCN: NEGATIVE
CALCIUM SERPL-MCNC: 9.2 MG/DL (ref 8.7–10.5)
CANNABINOIDS UR QL SCN: NEGATIVE
CHLORIDE SERPL-SCNC: 105 MMOL/L (ref 95–110)
CLARITY UR REFRACT.AUTO: ABNORMAL
CO2 SERPL-SCNC: 28 MMOL/L (ref 23–29)
COLOR UR AUTO: YELLOW
CREAT SERPL-MCNC: 1.1 MG/DL (ref 0.5–1.4)
CREAT UR-MCNC: 138.7 MG/DL (ref 23–375)
DIFFERENTIAL METHOD: NORMAL
EOSINOPHIL # BLD AUTO: 0.4 K/UL (ref 0–0.5)
EOSINOPHIL NFR BLD: 4.7 % (ref 0–8)
ERYTHROCYTE [DISTWIDTH] IN BLOOD BY AUTOMATED COUNT: 12.4 % (ref 11.5–14.5)
EST. GFR  (AFRICAN AMERICAN): >60 ML/MIN/1.73 M^2
EST. GFR  (NON AFRICAN AMERICAN): >60 ML/MIN/1.73 M^2
GLUCOSE SERPL-MCNC: 99 MG/DL (ref 70–110)
GLUCOSE UR QL STRIP: NEGATIVE
HCT VFR BLD AUTO: 43.6 % (ref 40–54)
HGB BLD-MCNC: 14.6 G/DL (ref 14–18)
HGB UR QL STRIP: NEGATIVE
IMM GRANULOCYTES # BLD AUTO: 0.02 K/UL (ref 0–0.04)
IMM GRANULOCYTES NFR BLD AUTO: 0.3 % (ref 0–0.5)
KETONES UR QL STRIP: NEGATIVE
LEUKOCYTE ESTERASE UR QL STRIP: NEGATIVE
LIPASE SERPL-CCNC: 28 U/L (ref 4–60)
LYMPHOCYTES # BLD AUTO: 1.6 K/UL (ref 1–4.8)
LYMPHOCYTES NFR BLD: 21.3 % (ref 18–48)
MCH RBC QN AUTO: 29.4 PG (ref 27–31)
MCHC RBC AUTO-ENTMCNC: 33.5 G/DL (ref 32–36)
MCV RBC AUTO: 88 FL (ref 82–98)
METHADONE UR QL SCN>300 NG/ML: NEGATIVE
MICROSCOPIC COMMENT: NORMAL
MONOCYTES # BLD AUTO: 0.7 K/UL (ref 0.3–1)
MONOCYTES NFR BLD: 9.2 % (ref 4–15)
NEUTROPHILS # BLD AUTO: 4.9 K/UL (ref 1.8–7.7)
NEUTROPHILS NFR BLD: 64 % (ref 38–73)
NITRITE UR QL STRIP: NEGATIVE
NRBC BLD-RTO: 0 /100 WBC
OPIATES UR QL SCN: NEGATIVE
PCP UR QL SCN>25 NG/ML: NEGATIVE
PH UR STRIP: 7 [PH] (ref 5–8)
PLATELET # BLD AUTO: 258 K/UL (ref 150–350)
PMV BLD AUTO: 10.3 FL (ref 9.2–12.9)
POTASSIUM SERPL-SCNC: 4 MMOL/L (ref 3.5–5.1)
PROT SERPL-MCNC: 7 G/DL (ref 6–8.4)
PROT UR QL STRIP: NEGATIVE
RBC # BLD AUTO: 4.96 M/UL (ref 4.6–6.2)
RBC #/AREA URNS AUTO: 1 /HPF (ref 0–4)
SODIUM SERPL-SCNC: 140 MMOL/L (ref 136–145)
SP GR UR STRIP: 1.01 (ref 1–1.03)
TOXICOLOGY INFORMATION: NORMAL
URN SPEC COLLECT METH UR: ABNORMAL
UROBILINOGEN UR STRIP-ACNC: NEGATIVE EU/DL
WBC # BLD AUTO: 7.59 K/UL (ref 3.9–12.7)

## 2021-02-10 PROCEDURE — 96374 THER/PROPH/DIAG INJ IV PUSH: CPT | Mod: ER

## 2021-02-10 PROCEDURE — 83690 ASSAY OF LIPASE: CPT | Mod: ER

## 2021-02-10 PROCEDURE — 99284 EMERGENCY DEPT VISIT MOD MDM: CPT | Mod: 25,ER

## 2021-02-10 PROCEDURE — 81000 URINALYSIS NONAUTO W/SCOPE: CPT | Mod: ER,59

## 2021-02-10 PROCEDURE — 63600175 PHARM REV CODE 636 W HCPCS: Mod: ER | Performed by: PHYSICIAN ASSISTANT

## 2021-02-10 PROCEDURE — 25000003 PHARM REV CODE 250: Mod: ER | Performed by: PHYSICIAN ASSISTANT

## 2021-02-10 PROCEDURE — 86803 HEPATITIS C AB TEST: CPT

## 2021-02-10 PROCEDURE — 80307 DRUG TEST PRSMV CHEM ANLYZR: CPT | Mod: ER

## 2021-02-10 PROCEDURE — 80053 COMPREHEN METABOLIC PANEL: CPT | Mod: ER

## 2021-02-10 PROCEDURE — 96361 HYDRATE IV INFUSION ADD-ON: CPT | Mod: ER

## 2021-02-10 PROCEDURE — 85025 COMPLETE CBC W/AUTO DIFF WBC: CPT | Mod: ER

## 2021-02-10 PROCEDURE — 86703 HIV-1/HIV-2 1 RESULT ANTBDY: CPT

## 2021-02-10 RX ORDER — ONDANSETRON 4 MG/1
4 TABLET, ORALLY DISINTEGRATING ORAL EVERY 6 HOURS PRN
Qty: 10 TABLET | Refills: 0 | Status: SHIPPED | OUTPATIENT
Start: 2021-02-10 | End: 2021-03-06 | Stop reason: CLARIF

## 2021-02-10 RX ORDER — CITALOPRAM 20 MG/1
40 TABLET, FILM COATED ORAL
Status: ON HOLD | COMMUNITY
End: 2021-07-16 | Stop reason: HOSPADM

## 2021-02-10 RX ORDER — TRAZODONE HYDROCHLORIDE 50 MG/1
150 TABLET ORAL
COMMUNITY
End: 2021-03-06 | Stop reason: CLARIF

## 2021-02-10 RX ORDER — ONDANSETRON 2 MG/ML
4 INJECTION INTRAMUSCULAR; INTRAVENOUS
Status: COMPLETED | OUTPATIENT
Start: 2021-02-10 | End: 2021-02-10

## 2021-02-10 RX ADMIN — ONDANSETRON 4 MG: 2 INJECTION INTRAMUSCULAR; INTRAVENOUS at 03:02

## 2021-02-10 RX ADMIN — SODIUM CHLORIDE 1000 ML: 0.9 INJECTION, SOLUTION INTRAVENOUS at 03:02

## 2021-02-11 LAB
HCV AB SERPL QL IA: NEGATIVE
HIV 1+2 AB+HIV1 P24 AG SERPL QL IA: NEGATIVE

## 2021-02-13 ENCOUNTER — HOSPITAL ENCOUNTER (EMERGENCY)
Facility: HOSPITAL | Age: 31
Discharge: HOME | End: 2021-02-13
Attending: EMERGENCY MEDICINE
Payer: MEDICAID

## 2021-02-13 VITALS
DIASTOLIC BLOOD PRESSURE: 81 MMHG | RESPIRATION RATE: 18 BRPM | TEMPERATURE: 99 F | SYSTOLIC BLOOD PRESSURE: 128 MMHG | HEIGHT: 66 IN | OXYGEN SATURATION: 99 % | BODY MASS INDEX: 30.47 KG/M2 | WEIGHT: 189.63 LBS | HEART RATE: 86 BPM

## 2021-02-13 DIAGNOSIS — K64.9 HEMORRHOIDS, UNSPECIFIED HEMORRHOID TYPE: Primary | ICD-10-CM

## 2021-02-13 PROCEDURE — 99283 EMERGENCY DEPT VISIT LOW MDM: CPT | Mod: ER

## 2021-02-13 RX ORDER — PANTOPRAZOLE SODIUM 40 MG/1
40 TABLET, DELAYED RELEASE ORAL DAILY
Status: ON HOLD | COMMUNITY
End: 2021-07-16 | Stop reason: SDUPTHER

## 2021-02-13 RX ORDER — HYDROCORTISONE 25 MG/G
CREAM TOPICAL 2 TIMES DAILY
Qty: 20 G | Refills: 0 | Status: SHIPPED | OUTPATIENT
Start: 2021-02-13 | End: 2021-03-06 | Stop reason: CLARIF

## 2021-02-13 RX ORDER — RISPERIDONE 1 MG/1
1 TABLET, ORALLY DISINTEGRATING ORAL 2 TIMES DAILY
Status: ON HOLD | COMMUNITY
End: 2021-07-16 | Stop reason: HOSPADM

## 2021-03-04 ENCOUNTER — HOSPITAL ENCOUNTER (EMERGENCY)
Facility: HOSPITAL | Age: 31
Discharge: HOME | End: 2021-03-04
Attending: EMERGENCY MEDICINE
Payer: MEDICAID

## 2021-03-04 VITALS
HEART RATE: 115 BPM | OXYGEN SATURATION: 97 % | TEMPERATURE: 98 F | WEIGHT: 190 LBS | SYSTOLIC BLOOD PRESSURE: 150 MMHG | BODY MASS INDEX: 30.67 KG/M2 | DIASTOLIC BLOOD PRESSURE: 76 MMHG | RESPIRATION RATE: 15 BRPM

## 2021-03-04 DIAGNOSIS — F15.10 AMPHETAMINE ABUSE: Primary | ICD-10-CM

## 2021-03-04 DIAGNOSIS — R00.0 TACHYCARDIA: ICD-10-CM

## 2021-03-04 DIAGNOSIS — T50.901A DRUG OVERDOSE: ICD-10-CM

## 2021-03-04 LAB
ALBUMIN SERPL BCP-MCNC: 4.6 G/DL (ref 3.5–5.2)
ALP SERPL-CCNC: 76 U/L (ref 55–135)
ALT SERPL W/O P-5'-P-CCNC: 33 U/L (ref 10–44)
AMPHET+METHAMPHET UR QL: NORMAL
ANION GAP SERPL CALC-SCNC: 11 MMOL/L (ref 8–16)
APAP SERPL-MCNC: <3 UG/ML (ref 10–20)
AST SERPL-CCNC: 69 U/L (ref 10–40)
BARBITURATES UR QL SCN>200 NG/ML: NEGATIVE
BASOPHILS # BLD AUTO: 0.03 K/UL (ref 0–0.2)
BASOPHILS NFR BLD: 0.3 % (ref 0–1.9)
BENZODIAZ UR QL SCN>200 NG/ML: NEGATIVE
BILIRUB SERPL-MCNC: 0.9 MG/DL (ref 0.1–1)
BILIRUB UR QL STRIP: NEGATIVE
BNP SERPL-MCNC: <10 PG/ML (ref 0–99)
BUN SERPL-MCNC: 10 MG/DL (ref 6–20)
BZE UR QL SCN: NEGATIVE
CALCIUM SERPL-MCNC: 9.1 MG/DL (ref 8.7–10.5)
CANNABINOIDS UR QL SCN: NEGATIVE
CHLORIDE SERPL-SCNC: 103 MMOL/L (ref 95–110)
CLARITY UR REFRACT.AUTO: CLEAR
CO2 SERPL-SCNC: 24 MMOL/L (ref 23–29)
COLOR UR AUTO: YELLOW
CREAT SERPL-MCNC: 1 MG/DL (ref 0.5–1.4)
CREAT UR-MCNC: 58.8 MG/DL (ref 23–375)
DIFFERENTIAL METHOD: ABNORMAL
EOSINOPHIL # BLD AUTO: 0.1 K/UL (ref 0–0.5)
EOSINOPHIL NFR BLD: 0.7 % (ref 0–8)
ERYTHROCYTE [DISTWIDTH] IN BLOOD BY AUTOMATED COUNT: 12.7 % (ref 11.5–14.5)
EST. GFR  (AFRICAN AMERICAN): >60 ML/MIN/1.73 M^2
EST. GFR  (NON AFRICAN AMERICAN): >60 ML/MIN/1.73 M^2
ETHANOL SERPL-MCNC: <10 MG/DL
GLUCOSE SERPL-MCNC: 109 MG/DL (ref 70–110)
GLUCOSE UR QL STRIP: NEGATIVE
HCT VFR BLD AUTO: 42.4 % (ref 40–54)
HGB BLD-MCNC: 14.6 G/DL (ref 14–18)
HGB UR QL STRIP: NEGATIVE
IMM GRANULOCYTES # BLD AUTO: 0.03 K/UL (ref 0–0.04)
IMM GRANULOCYTES NFR BLD AUTO: 0.3 % (ref 0–0.5)
KETONES UR QL STRIP: NEGATIVE
LEUKOCYTE ESTERASE UR QL STRIP: NEGATIVE
LYMPHOCYTES # BLD AUTO: 1.8 K/UL (ref 1–4.8)
LYMPHOCYTES NFR BLD: 16.3 % (ref 18–48)
MCH RBC QN AUTO: 30 PG (ref 27–31)
MCHC RBC AUTO-ENTMCNC: 34.4 G/DL (ref 32–36)
MCV RBC AUTO: 87 FL (ref 82–98)
METHADONE UR QL SCN>300 NG/ML: NEGATIVE
MONOCYTES # BLD AUTO: 1.2 K/UL (ref 0.3–1)
MONOCYTES NFR BLD: 11.3 % (ref 4–15)
NEUTROPHILS # BLD AUTO: 7.8 K/UL (ref 1.8–7.7)
NEUTROPHILS NFR BLD: 71.1 % (ref 38–73)
NITRITE UR QL STRIP: NEGATIVE
NRBC BLD-RTO: 0 /100 WBC
OPIATES UR QL SCN: NEGATIVE
PCP UR QL SCN>25 NG/ML: NEGATIVE
PH UR STRIP: 7 [PH] (ref 5–8)
PLATELET # BLD AUTO: 315 K/UL (ref 150–350)
PMV BLD AUTO: 10.5 FL (ref 9.2–12.9)
POTASSIUM SERPL-SCNC: 3.8 MMOL/L (ref 3.5–5.1)
PROT SERPL-MCNC: 7.4 G/DL (ref 6–8.4)
PROT UR QL STRIP: NEGATIVE
RBC # BLD AUTO: 4.87 M/UL (ref 4.6–6.2)
SODIUM SERPL-SCNC: 138 MMOL/L (ref 136–145)
SP GR UR STRIP: 1.01 (ref 1–1.03)
TOXICOLOGY INFORMATION: NORMAL
TROPONIN I SERPL DL<=0.01 NG/ML-MCNC: <0.006 NG/ML (ref 0–0.03)
TSH SERPL DL<=0.005 MIU/L-ACNC: 1.84 UIU/ML (ref 0.4–4)
URN SPEC COLLECT METH UR: NORMAL
UROBILINOGEN UR STRIP-ACNC: NEGATIVE EU/DL
WBC # BLD AUTO: 10.96 K/UL (ref 3.9–12.7)

## 2021-03-04 PROCEDURE — 83880 ASSAY OF NATRIURETIC PEPTIDE: CPT | Mod: ER | Performed by: EMERGENCY MEDICINE

## 2021-03-04 PROCEDURE — 81003 URINALYSIS AUTO W/O SCOPE: CPT | Mod: ER,59 | Performed by: EMERGENCY MEDICINE

## 2021-03-04 PROCEDURE — 93005 ELECTROCARDIOGRAM TRACING: CPT | Mod: ER

## 2021-03-04 PROCEDURE — 82077 ASSAY SPEC XCP UR&BREATH IA: CPT | Mod: ER | Performed by: EMERGENCY MEDICINE

## 2021-03-04 PROCEDURE — 85025 COMPLETE CBC W/AUTO DIFF WBC: CPT | Mod: ER | Performed by: EMERGENCY MEDICINE

## 2021-03-04 PROCEDURE — 99284 EMERGENCY DEPT VISIT MOD MDM: CPT | Mod: 25,ER

## 2021-03-04 PROCEDURE — 63600175 PHARM REV CODE 636 W HCPCS: Mod: ER | Performed by: EMERGENCY MEDICINE

## 2021-03-04 PROCEDURE — 84443 ASSAY THYROID STIM HORMONE: CPT | Mod: ER | Performed by: EMERGENCY MEDICINE

## 2021-03-04 PROCEDURE — 93010 EKG 12-LEAD: ICD-10-PCS | Mod: ,,, | Performed by: INTERNAL MEDICINE

## 2021-03-04 PROCEDURE — 96374 THER/PROPH/DIAG INJ IV PUSH: CPT | Mod: ER

## 2021-03-04 PROCEDURE — 80307 DRUG TEST PRSMV CHEM ANLYZR: CPT | Mod: ER | Performed by: EMERGENCY MEDICINE

## 2021-03-04 PROCEDURE — 93010 ELECTROCARDIOGRAM REPORT: CPT | Mod: ,,, | Performed by: INTERNAL MEDICINE

## 2021-03-04 PROCEDURE — 25000003 PHARM REV CODE 250: Mod: ER | Performed by: EMERGENCY MEDICINE

## 2021-03-04 PROCEDURE — 80143 DRUG ASSAY ACETAMINOPHEN: CPT | Mod: ER | Performed by: EMERGENCY MEDICINE

## 2021-03-04 PROCEDURE — 80053 COMPREHEN METABOLIC PANEL: CPT | Mod: ER | Performed by: EMERGENCY MEDICINE

## 2021-03-04 PROCEDURE — 96361 HYDRATE IV INFUSION ADD-ON: CPT | Mod: ER

## 2021-03-04 PROCEDURE — 84484 ASSAY OF TROPONIN QUANT: CPT | Mod: ER | Performed by: EMERGENCY MEDICINE

## 2021-03-04 RX ORDER — LORAZEPAM 1 MG/1
2 TABLET ORAL
Status: COMPLETED | OUTPATIENT
Start: 2021-03-04 | End: 2021-03-04

## 2021-03-04 RX ADMIN — SODIUM CHLORIDE 1000 ML: 0.9 INJECTION, SOLUTION INTRAVENOUS at 06:03

## 2021-03-04 RX ADMIN — LORAZEPAM 1 MG: 2 INJECTION INTRAMUSCULAR; INTRAVENOUS at 08:03

## 2021-03-04 RX ADMIN — LORAZEPAM 2 MG: 1 TABLET ORAL at 04:03

## 2021-03-05 ENCOUNTER — HOSPITAL ENCOUNTER (EMERGENCY)
Facility: HOSPITAL | Age: 31
Discharge: HOME | End: 2021-03-07
Attending: EMERGENCY MEDICINE
Payer: MEDICAID

## 2021-03-05 DIAGNOSIS — F19.10 DRUG ABUSE: ICD-10-CM

## 2021-03-05 DIAGNOSIS — S99.911A RIGHT ANKLE INJURY, INITIAL ENCOUNTER: ICD-10-CM

## 2021-03-05 DIAGNOSIS — Z78.9 DIPHTHERIA-PERTUSSIS-TETANUS (DPT) VACCINATION ADMINISTERED AT CURRENT VISIT: ICD-10-CM
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
## 2021-03-05 DIAGNOSIS — S92.354A CLOSED NONDISPLACED FRACTURE OF FIFTH METATARSAL BONE OF RIGHT FOOT, INITIAL ENCOUNTER: Primary | ICD-10-CM

## 2021-03-05 DIAGNOSIS — F39 MOOD DISORDER: ICD-10-CM

## 2021-03-05 LAB
ALBUMIN SERPL BCP-MCNC: 4.2 G/DL (ref 3.5–5.2)
ALP SERPL-CCNC: 78 U/L (ref 55–135)
ALT SERPL W/O P-5'-P-CCNC: 34 U/L (ref 10–44)
AMPHET+METHAMPHET UR QL: NORMAL
ANION GAP SERPL CALC-SCNC: 11 MMOL/L (ref 8–16)
APAP SERPL-MCNC: <3 UG/ML (ref 10–20)
AST SERPL-CCNC: 65 U/L (ref 10–40)
BARBITURATES UR QL SCN>200 NG/ML: NEGATIVE
BASOPHILS # BLD AUTO: 0.04 K/UL (ref 0–0.2)
BASOPHILS NFR BLD: 0.4 % (ref 0–1.9)
BENZODIAZ UR QL SCN>200 NG/ML: NEGATIVE
BILIRUB SERPL-MCNC: 1 MG/DL (ref 0.1–1)
BILIRUB UR QL STRIP: NEGATIVE
BUN SERPL-MCNC: 10 MG/DL (ref 6–20)
BZE UR QL SCN: NEGATIVE
CALCIUM SERPL-MCNC: 8.9 MG/DL (ref 8.7–10.5)
CANNABINOIDS UR QL SCN: NEGATIVE
CHLORIDE SERPL-SCNC: 106 MMOL/L (ref 95–110)
CLARITY UR REFRACT.AUTO: CLEAR
CO2 SERPL-SCNC: 23 MMOL/L (ref 23–29)
COLOR UR AUTO: YELLOW
CREAT SERPL-MCNC: 1 MG/DL (ref 0.5–1.4)
CREAT UR-MCNC: 70.9 MG/DL (ref 23–375)
DIFFERENTIAL METHOD: ABNORMAL
EOSINOPHIL # BLD AUTO: 0.2 K/UL (ref 0–0.5)
EOSINOPHIL NFR BLD: 2 % (ref 0–8)
ERYTHROCYTE [DISTWIDTH] IN BLOOD BY AUTOMATED COUNT: 12.7 % (ref 11.5–14.5)
EST. GFR  (AFRICAN AMERICAN): >60 ML/MIN/1.73 M^2
EST. GFR  (NON AFRICAN AMERICAN): >60 ML/MIN/1.73 M^2
ETHANOL SERPL-MCNC: <10 MG/DL
GLUCOSE SERPL-MCNC: 95 MG/DL (ref 70–110)
GLUCOSE UR QL STRIP: NEGATIVE
HCT VFR BLD AUTO: 39.7 % (ref 40–54)
HGB BLD-MCNC: 13.6 G/DL (ref 14–18)
HGB UR QL STRIP: NEGATIVE
IMM GRANULOCYTES # BLD AUTO: 0.02 K/UL (ref 0–0.04)
IMM GRANULOCYTES NFR BLD AUTO: 0.2 % (ref 0–0.5)
KETONES UR QL STRIP: NEGATIVE
LEUKOCYTE ESTERASE UR QL STRIP: NEGATIVE
LYMPHOCYTES # BLD AUTO: 2 K/UL (ref 1–4.8)
LYMPHOCYTES NFR BLD: 20.5 % (ref 18–48)
MCH RBC QN AUTO: 30 PG (ref 27–31)
MCHC RBC AUTO-ENTMCNC: 34.3 G/DL (ref 32–36)
MCV RBC AUTO: 88 FL (ref 82–98)
METHADONE UR QL SCN>300 NG/ML: NEGATIVE
MONOCYTES # BLD AUTO: 0.9 K/UL (ref 0.3–1)
MONOCYTES NFR BLD: 8.8 % (ref 4–15)
NEUTROPHILS # BLD AUTO: 6.6 K/UL (ref 1.8–7.7)
NEUTROPHILS NFR BLD: 68.1 % (ref 38–73)
NITRITE UR QL STRIP: NEGATIVE
NRBC BLD-RTO: 0 /100 WBC
OPIATES UR QL SCN: NEGATIVE
PCP UR QL SCN>25 NG/ML: NEGATIVE
PH UR STRIP: 7 [PH] (ref 5–8)
PLATELET # BLD AUTO: 277 K/UL (ref 150–350)
PMV BLD AUTO: 10.4 FL (ref 9.2–12.9)
POTASSIUM SERPL-SCNC: 4 MMOL/L (ref 3.5–5.1)
PROT SERPL-MCNC: 6.7 G/DL (ref 6–8.4)
PROT UR QL STRIP: NEGATIVE
RBC # BLD AUTO: 4.53 M/UL (ref 4.6–6.2)
SARS-COV-2 RDRP RESP QL NAA+PROBE: NEGATIVE
SODIUM SERPL-SCNC: 140 MMOL/L (ref 136–145)
SP GR UR STRIP: 1.01 (ref 1–1.03)
TOXICOLOGY INFORMATION: NORMAL
TSH SERPL DL<=0.005 MIU/L-ACNC: 1.2 UIU/ML (ref 0.4–4)
URN SPEC COLLECT METH UR: NORMAL
UROBILINOGEN UR STRIP-ACNC: NEGATIVE EU/DL
WBC # BLD AUTO: 9.76 K/UL (ref 3.9–12.7)

## 2021-03-05 PROCEDURE — 99203 OFFICE O/P NEW LOW 30 MIN: CPT | Mod: 95,AF,HB, | Performed by: PSYCHIATRY & NEUROLOGY

## 2021-03-05 PROCEDURE — 85025 COMPLETE CBC W/AUTO DIFF WBC: CPT | Mod: ER | Performed by: PHYSICIAN ASSISTANT

## 2021-03-05 PROCEDURE — 90715 TDAP VACCINE 7 YRS/> IM: CPT | Mod: ER

## 2021-03-05 PROCEDURE — U0002 COVID-19 LAB TEST NON-CDC: HCPCS | Mod: ER | Performed by: PHYSICIAN ASSISTANT

## 2021-03-05 PROCEDURE — 80053 COMPREHEN METABOLIC PANEL: CPT | Mod: ER | Performed by: PHYSICIAN ASSISTANT

## 2021-03-05 PROCEDURE — 29515 APPLICATION SHORT LEG SPLINT: CPT

## 2021-03-05 PROCEDURE — 82077 ASSAY SPEC XCP UR&BREATH IA: CPT | Mod: ER | Performed by: PHYSICIAN ASSISTANT

## 2021-03-05 PROCEDURE — 25000003 PHARM REV CODE 250: Mod: ER | Performed by: EMERGENCY MEDICINE

## 2021-03-05 PROCEDURE — 99203 PR OFFICE/OUTPT VISIT, NEW, LEVL III, 30-44 MIN: ICD-10-PCS | Mod: 95,AF,HB, | Performed by: PSYCHIATRY & NEUROLOGY

## 2021-03-05 PROCEDURE — S4991 NICOTINE PATCH NONLEGEND: HCPCS | Mod: ER | Performed by: EMERGENCY MEDICINE

## 2021-03-05 PROCEDURE — 80143 DRUG ASSAY ACETAMINOPHEN: CPT | Mod: ER | Performed by: PHYSICIAN ASSISTANT

## 2021-03-05 PROCEDURE — 12001 RPR S/N/AX/GEN/TRNK 2.5CM/<: CPT | Mod: ER

## 2021-03-05 PROCEDURE — 90471 IMMUNIZATION ADMIN: CPT | Mod: ER

## 2021-03-05 PROCEDURE — 99284 EMERGENCY DEPT VISIT MOD MDM: CPT | Mod: 25,ER

## 2021-03-05 PROCEDURE — 63600175 PHARM REV CODE 636 W HCPCS: Mod: ER

## 2021-03-05 PROCEDURE — 81003 URINALYSIS AUTO W/O SCOPE: CPT | Mod: 59,ER | Performed by: PHYSICIAN ASSISTANT

## 2021-03-05 PROCEDURE — 80307 DRUG TEST PRSMV CHEM ANLYZR: CPT | Mod: ER | Performed by: PHYSICIAN ASSISTANT

## 2021-03-05 PROCEDURE — 84443 ASSAY THYROID STIM HORMONE: CPT | Mod: ER | Performed by: PHYSICIAN ASSISTANT

## 2021-03-05 RX ORDER — NAPROXEN 500 MG/1
500 TABLET ORAL 2 TIMES DAILY WITH MEALS
Qty: 12 TABLET | Refills: 0 | Status: SHIPPED | OUTPATIENT
Start: 2021-03-05 | End: 2021-03-07 | Stop reason: SDUPTHER

## 2021-03-05 RX ORDER — ACETAMINOPHEN 325 MG/1
650 TABLET ORAL
Status: COMPLETED | OUTPATIENT
Start: 2021-03-05 | End: 2021-03-05

## 2021-03-05 RX ORDER — IBUPROFEN 200 MG
800 TABLET ORAL
Status: COMPLETED | OUTPATIENT
Start: 2021-03-05 | End: 2021-03-05

## 2021-03-05 RX ORDER — IBUPROFEN 200 MG
1 TABLET ORAL
Status: COMPLETED | OUTPATIENT
Start: 2021-03-05 | End: 2021-03-06

## 2021-03-05 RX ORDER — LORAZEPAM 1 MG/1
1 TABLET ORAL
Status: COMPLETED | OUTPATIENT
Start: 2021-03-05 | End: 2021-03-05

## 2021-03-05 RX ADMIN — IBUPROFEN 800 MG: 200 TABLET, FILM COATED ORAL at 10:03

## 2021-03-05 RX ADMIN — LORAZEPAM 1 MG: 1 TABLET ORAL at 10:03

## 2021-03-05 RX ADMIN — CLOSTRIDIUM TETANI TOXOID ANTIGEN (FORMALDEHYDE INACTIVATED), CORYNEBACTERIUM DIPHTHERIAE TOXOID ANTIGEN (FORMALDEHYDE INACTIVATED), BORDETELLA PERTUSSIS TOXOID ANTIGEN (GLUTARALDEHYDE INACTIVATED), BORDETELLA PERTUSSIS FILAMENTOUS HEMAGGLUTININ ANTIGEN (FORMALDEHYDE INACTIVATED), BORDETELLA PERTUSSIS PERTACTIN ANTIGEN, AND BORDETELLA PERTUSSIS FIMBRIAE 2/3 ANTIGEN 0.5 ML: 5; 2; 2.5; 5; 3; 5 INJECTION, SUSPENSION INTRAMUSCULAR at 01:03

## 2021-03-05 RX ADMIN — Medication 1 PATCH: at 04:03

## 2021-03-05 RX ADMIN — ACETAMINOPHEN 650 MG: 325 TABLET ORAL at 11:03

## 2021-03-06 PROCEDURE — S4991 NICOTINE PATCH NONLEGEND: HCPCS | Mod: ER | Performed by: EMERGENCY MEDICINE

## 2021-03-06 PROCEDURE — 25000003 PHARM REV CODE 250: Mod: ER | Performed by: EMERGENCY MEDICINE

## 2021-03-06 RX ORDER — ACETAMINOPHEN 325 MG/1
650 TABLET ORAL
Status: COMPLETED | OUTPATIENT
Start: 2021-03-06 | End: 2021-03-06

## 2021-03-06 RX ORDER — TRAZODONE HYDROCHLORIDE 150 MG/1
150 TABLET ORAL NIGHTLY
COMMUNITY
End: 2021-04-02 | Stop reason: CLARIF

## 2021-03-06 RX ORDER — IBUPROFEN 200 MG
400 TABLET ORAL
Status: COMPLETED | OUTPATIENT
Start: 2021-03-06 | End: 2021-03-06

## 2021-03-06 RX ORDER — LORAZEPAM 1 MG/1
1 TABLET ORAL
Status: COMPLETED | OUTPATIENT
Start: 2021-03-06 | End: 2021-03-06

## 2021-03-06 RX ORDER — AMITRIPTYLINE HYDROCHLORIDE 50 MG/1
50 TABLET, FILM COATED ORAL DAILY
COMMUNITY
Start: 2021-02-18 | End: 2021-03-06 | Stop reason: CLARIF

## 2021-03-06 RX ORDER — IBUPROFEN 200 MG
1 TABLET ORAL
Status: COMPLETED | OUTPATIENT
Start: 2021-03-06 | End: 2021-03-07

## 2021-03-06 RX ORDER — HYDROCODONE BITARTRATE AND ACETAMINOPHEN 5; 325 MG/1; MG/1
1 TABLET ORAL EVERY 6 HOURS PRN
Status: DISCONTINUED | OUTPATIENT
Start: 2021-03-06 | End: 2021-03-07 | Stop reason: HOSPADM

## 2021-03-06 RX ORDER — HYDROCODONE BITARTRATE AND ACETAMINOPHEN 5; 325 MG/1; MG/1
1 TABLET ORAL
Status: COMPLETED | OUTPATIENT
Start: 2021-03-06 | End: 2021-03-06

## 2021-03-06 RX ORDER — HYDROXYZINE PAMOATE 50 MG/1
50 CAPSULE ORAL
COMMUNITY
End: 2021-03-06 | Stop reason: CLARIF

## 2021-03-06 RX ORDER — SILDENAFIL CITRATE 20 MG/1
TABLET ORAL
Status: ON HOLD | COMMUNITY
End: 2022-04-03 | Stop reason: HOSPADM

## 2021-03-06 RX ADMIN — Medication 1 PATCH: at 09:03

## 2021-03-06 RX ADMIN — LORAZEPAM 1 MG: 1 TABLET ORAL at 07:03

## 2021-03-06 RX ADMIN — HYDROCODONE BITARTRATE AND ACETAMINOPHEN 1 TABLET: 5; 325 TABLET ORAL at 07:03

## 2021-03-06 RX ADMIN — ACETAMINOPHEN 650 MG: 325 TABLET ORAL at 07:03

## 2021-03-06 RX ADMIN — IBUPROFEN 400 MG: 200 TABLET, FILM COATED ORAL at 07:03

## 2021-03-06 RX ADMIN — HYDROCODONE BITARTRATE AND ACETAMINOPHEN 1 TABLET: 5; 325 TABLET ORAL at 01:03

## 2021-03-07 VITALS
SYSTOLIC BLOOD PRESSURE: 122 MMHG | HEART RATE: 81 BPM | RESPIRATION RATE: 17 BRPM | DIASTOLIC BLOOD PRESSURE: 72 MMHG | TEMPERATURE: 100 F | WEIGHT: 190 LBS | BODY MASS INDEX: 30.53 KG/M2 | HEIGHT: 66 IN | OXYGEN SATURATION: 96 %

## 2021-03-07 PROCEDURE — 25000003 PHARM REV CODE 250: Mod: ER | Performed by: EMERGENCY MEDICINE

## 2021-03-07 RX ORDER — ACETAMINOPHEN 325 MG/1
650 TABLET ORAL
Status: COMPLETED | OUTPATIENT
Start: 2021-03-07 | End: 2021-03-07

## 2021-03-07 RX ORDER — TRAZODONE HYDROCHLORIDE 150 MG/1
150 TABLET ORAL NIGHTLY
Status: DISCONTINUED | OUTPATIENT
Start: 2021-03-07 | End: 2021-03-07 | Stop reason: HOSPADM

## 2021-03-07 RX ORDER — PANTOPRAZOLE SODIUM 40 MG/1
40 TABLET, DELAYED RELEASE ORAL DAILY
Status: DISCONTINUED | OUTPATIENT
Start: 2021-03-07 | End: 2021-03-07 | Stop reason: HOSPADM

## 2021-03-07 RX ORDER — NAPROXEN 500 MG/1
500 TABLET ORAL 2 TIMES DAILY WITH MEALS
Qty: 28 TABLET | Refills: 0 | Status: SHIPPED | OUTPATIENT
Start: 2021-03-07 | End: 2021-03-21

## 2021-03-07 RX ORDER — RISPERIDONE 1 MG/1
1 TABLET ORAL 2 TIMES DAILY
Status: DISCONTINUED | OUTPATIENT
Start: 2021-03-07 | End: 2021-03-07 | Stop reason: HOSPADM

## 2021-03-07 RX ORDER — GABAPENTIN 300 MG/1
600 CAPSULE ORAL 3 TIMES DAILY
Status: DISCONTINUED | OUTPATIENT
Start: 2021-03-07 | End: 2021-03-07 | Stop reason: HOSPADM

## 2021-03-07 RX ORDER — PANTOPRAZOLE SODIUM 40 MG/1
40 TABLET, DELAYED RELEASE ORAL
Status: DISCONTINUED | OUTPATIENT
Start: 2021-03-07 | End: 2021-03-07

## 2021-03-07 RX ORDER — CITALOPRAM 20 MG/1
40 TABLET, FILM COATED ORAL DAILY
Status: DISCONTINUED | OUTPATIENT
Start: 2021-03-07 | End: 2021-03-07 | Stop reason: HOSPADM

## 2021-03-07 RX ADMIN — ACETAMINOPHEN 650 MG: 325 TABLET ORAL at 08:03

## 2021-03-13 ENCOUNTER — HOSPITAL ENCOUNTER (EMERGENCY)
Facility: HOSPITAL | Age: 31
Discharge: HOME | End: 2021-03-13
Attending: EMERGENCY MEDICINE
Payer: MEDICAID

## 2021-03-13 VITALS
OXYGEN SATURATION: 96 % | SYSTOLIC BLOOD PRESSURE: 135 MMHG | HEIGHT: 67 IN | HEART RATE: 105 BPM | WEIGHT: 190 LBS | TEMPERATURE: 98 F | DIASTOLIC BLOOD PRESSURE: 87 MMHG | BODY MASS INDEX: 29.82 KG/M2 | RESPIRATION RATE: 18 BRPM

## 2021-03-13 DIAGNOSIS — S90.01XA CONTUSION OF RIGHT ANKLE, INITIAL ENCOUNTER: Primary | ICD-10-CM

## 2021-03-13 DIAGNOSIS — M84.374D METATARSAL STRESS FRACTURE OF RIGHT FOOT WITH ROUTINE HEALING: ICD-10-CM

## 2021-03-13 DIAGNOSIS — S99.911A RIGHT ANKLE INJURY: ICD-10-CM

## 2021-03-13 DIAGNOSIS — M19.171 POST-TRAUMATIC ARTHRITIS OF RIGHT FOOT: ICD-10-CM

## 2021-03-13 PROCEDURE — 96372 THER/PROPH/DIAG INJ SC/IM: CPT | Mod: ER

## 2021-03-13 PROCEDURE — 99284 EMERGENCY DEPT VISIT MOD MDM: CPT | Mod: 25,ER

## 2021-03-13 PROCEDURE — 63600175 PHARM REV CODE 636 W HCPCS: Mod: ER | Performed by: EMERGENCY MEDICINE

## 2021-03-13 RX ORDER — KETOROLAC TROMETHAMINE 30 MG/ML
30 INJECTION, SOLUTION INTRAMUSCULAR; INTRAVENOUS
Status: COMPLETED | OUTPATIENT
Start: 2021-03-13 | End: 2021-03-13

## 2021-03-13 RX ADMIN — KETOROLAC TROMETHAMINE 30 MG: 30 INJECTION, SOLUTION INTRAMUSCULAR; INTRAVENOUS at 09:03

## 2021-04-02 ENCOUNTER — HOSPITAL ENCOUNTER (EMERGENCY)
Facility: HOSPITAL | Age: 31
Discharge: HOME | End: 2021-04-02
Attending: EMERGENCY MEDICINE
Payer: MEDICAID

## 2021-04-02 VITALS
TEMPERATURE: 99 F | DIASTOLIC BLOOD PRESSURE: 76 MMHG | BODY MASS INDEX: 28.19 KG/M2 | RESPIRATION RATE: 20 BRPM | WEIGHT: 180 LBS | HEART RATE: 93 BPM | SYSTOLIC BLOOD PRESSURE: 148 MMHG | OXYGEN SATURATION: 99 %

## 2021-04-02 DIAGNOSIS — R05.9 COUGH: Primary | ICD-10-CM

## 2021-04-02 DIAGNOSIS — R06.02 SOB (SHORTNESS OF BREATH): ICD-10-CM

## 2021-04-02 DIAGNOSIS — R06.00 DYSPNEA, UNSPECIFIED TYPE: ICD-10-CM

## 2021-04-02 LAB
CTP QC/QA: YES
SARS-COV-2 RDRP RESP QL NAA+PROBE: NEGATIVE

## 2021-04-02 PROCEDURE — U0002 COVID-19 LAB TEST NON-CDC: HCPCS | Mod: ER | Performed by: EMERGENCY MEDICINE

## 2021-04-02 PROCEDURE — 93010 EKG 12-LEAD: ICD-10-PCS | Mod: ,,, | Performed by: INTERNAL MEDICINE

## 2021-04-02 PROCEDURE — 93005 ELECTROCARDIOGRAM TRACING: CPT | Mod: ER

## 2021-04-02 PROCEDURE — 99285 EMERGENCY DEPT VISIT HI MDM: CPT | Mod: 25,ER

## 2021-04-02 PROCEDURE — 93010 ELECTROCARDIOGRAM REPORT: CPT | Mod: ,,, | Performed by: INTERNAL MEDICINE

## 2021-04-04 ENCOUNTER — HOSPITAL ENCOUNTER (EMERGENCY)
Facility: HOSPITAL | Age: 31
Discharge: HOME | End: 2021-04-04
Attending: EMERGENCY MEDICINE
Payer: MEDICAID

## 2021-04-04 VITALS
HEART RATE: 100 BPM | OXYGEN SATURATION: 97 % | BODY MASS INDEX: 28.19 KG/M2 | WEIGHT: 175.38 LBS | RESPIRATION RATE: 20 BRPM | DIASTOLIC BLOOD PRESSURE: 81 MMHG | SYSTOLIC BLOOD PRESSURE: 143 MMHG | TEMPERATURE: 99 F | HEIGHT: 66 IN

## 2021-04-04 DIAGNOSIS — F41.9 ANXIETY: Primary | ICD-10-CM

## 2021-04-04 PROCEDURE — 25000003 PHARM REV CODE 250: Mod: ER | Performed by: EMERGENCY MEDICINE

## 2021-04-04 PROCEDURE — 99283 EMERGENCY DEPT VISIT LOW MDM: CPT | Mod: ER

## 2021-04-04 RX ORDER — LORAZEPAM 1 MG/1
2 TABLET ORAL
Status: COMPLETED | OUTPATIENT
Start: 2021-04-04 | End: 2021-04-04

## 2021-04-04 RX ADMIN — LORAZEPAM 2 MG: 1 TABLET ORAL at 08:04

## 2021-04-18 ENCOUNTER — HOSPITAL ENCOUNTER (EMERGENCY)
Facility: HOSPITAL | Age: 31
Discharge: HOME | End: 2021-04-18
Attending: EMERGENCY MEDICINE
Payer: MEDICAID

## 2021-04-18 VITALS
TEMPERATURE: 98 F | DIASTOLIC BLOOD PRESSURE: 84 MMHG | RESPIRATION RATE: 15 BRPM | BODY MASS INDEX: 28.11 KG/M2 | SYSTOLIC BLOOD PRESSURE: 136 MMHG | WEIGHT: 174.19 LBS | HEART RATE: 103 BPM | OXYGEN SATURATION: 100 %

## 2021-04-18 DIAGNOSIS — F41.9 ANXIETY: Primary | ICD-10-CM

## 2021-04-18 DIAGNOSIS — R06.02 SOB (SHORTNESS OF BREATH): ICD-10-CM

## 2021-04-18 LAB
CTP QC/QA: YES
SARS-COV-2 RDRP RESP QL NAA+PROBE: NEGATIVE

## 2021-04-18 PROCEDURE — 25000242 PHARM REV CODE 250 ALT 637 W/ HCPCS: Mod: ER | Performed by: EMERGENCY MEDICINE

## 2021-04-18 PROCEDURE — 25000003 PHARM REV CODE 250: Mod: ER | Performed by: EMERGENCY MEDICINE

## 2021-04-18 PROCEDURE — U0002 COVID-19 LAB TEST NON-CDC: HCPCS | Mod: ER | Performed by: EMERGENCY MEDICINE

## 2021-04-18 PROCEDURE — 94640 AIRWAY INHALATION TREATMENT: CPT | Mod: ER

## 2021-04-18 PROCEDURE — 99283 EMERGENCY DEPT VISIT LOW MDM: CPT | Mod: 25,ER

## 2021-04-18 RX ORDER — LORAZEPAM 1 MG/1
1 TABLET ORAL
Status: COMPLETED | OUTPATIENT
Start: 2021-04-18 | End: 2021-04-18

## 2021-04-18 RX ORDER — IPRATROPIUM BROMIDE AND ALBUTEROL SULFATE 2.5; .5 MG/3ML; MG/3ML
3 SOLUTION RESPIRATORY (INHALATION)
Status: COMPLETED | OUTPATIENT
Start: 2021-04-18 | End: 2021-04-18

## 2021-04-18 RX ADMIN — IPRATROPIUM BROMIDE AND ALBUTEROL SULFATE 3 ML: .5; 3 SOLUTION RESPIRATORY (INHALATION) at 03:04

## 2021-04-18 RX ADMIN — LORAZEPAM 1 MG: 1 TABLET ORAL at 03:04

## 2021-04-25 ENCOUNTER — HOSPITAL ENCOUNTER (EMERGENCY)
Facility: HOSPITAL | Age: 31
Discharge: HOME | End: 2021-04-25
Attending: EMERGENCY MEDICINE
Payer: MEDICAID

## 2021-04-25 VITALS
WEIGHT: 180.75 LBS | OXYGEN SATURATION: 97 % | SYSTOLIC BLOOD PRESSURE: 136 MMHG | HEIGHT: 67 IN | TEMPERATURE: 99 F | BODY MASS INDEX: 28.37 KG/M2 | HEART RATE: 91 BPM | DIASTOLIC BLOOD PRESSURE: 77 MMHG | RESPIRATION RATE: 18 BRPM

## 2021-04-25 DIAGNOSIS — F41.9 ANXIETY: Primary | ICD-10-CM

## 2021-04-25 DIAGNOSIS — K59.00 CONSTIPATION: ICD-10-CM

## 2021-04-25 PROCEDURE — 25000003 PHARM REV CODE 250: Mod: ER | Performed by: NURSE PRACTITIONER

## 2021-04-25 PROCEDURE — 99283 EMERGENCY DEPT VISIT LOW MDM: CPT | Mod: 25,ER

## 2021-04-25 RX ORDER — SYRING-NEEDL,DISP,INSUL,0.3 ML 29 G X1/2"
296 SYRINGE, EMPTY DISPOSABLE MISCELLANEOUS ONCE
Refills: 0 | COMMUNITY
Start: 2021-04-25 | End: 2021-04-25

## 2021-04-25 RX ORDER — DOCUSATE SODIUM 100 MG/1
100 CAPSULE, LIQUID FILLED ORAL 3 TIMES DAILY PRN
Qty: 30 CAPSULE | Refills: 0 | Status: SHIPPED | OUTPATIENT
Start: 2021-04-25 | End: 2021-09-01

## 2021-04-25 RX ORDER — LORAZEPAM 1 MG/1
1 TABLET ORAL
Status: COMPLETED | OUTPATIENT
Start: 2021-04-25 | End: 2021-04-25

## 2021-04-25 RX ORDER — DEXTROAMPHETAMINE SACCHARATE, AMPHETAMINE ASPARTATE MONOHYDRATE, DEXTROAMPHETAMINE SULFATE AND AMPHETAMINE SULFATE 2.5; 2.5; 2.5; 2.5 MG/1; MG/1; MG/1; MG/1
30 CAPSULE, EXTENDED RELEASE ORAL EVERY MORNING
Status: ON HOLD | COMMUNITY
End: 2021-07-16 | Stop reason: HOSPADM

## 2021-04-25 RX ADMIN — LORAZEPAM 1 MG: 1 TABLET ORAL at 07:04

## 2021-04-29 ENCOUNTER — PATIENT MESSAGE (OUTPATIENT)
Dept: RESEARCH | Facility: HOSPITAL | Age: 31
End: 2021-04-29

## 2021-06-19 ENCOUNTER — HOSPITAL ENCOUNTER (EMERGENCY)
Facility: HOSPITAL | Age: 31
Discharge: HOME | End: 2021-06-19
Attending: EMERGENCY MEDICINE
Payer: MEDICAID

## 2021-06-19 VITALS
TEMPERATURE: 98 F | RESPIRATION RATE: 20 BRPM | WEIGHT: 162.94 LBS | BODY MASS INDEX: 25.57 KG/M2 | HEART RATE: 98 BPM | HEIGHT: 67 IN | OXYGEN SATURATION: 100 % | SYSTOLIC BLOOD PRESSURE: 135 MMHG | DIASTOLIC BLOOD PRESSURE: 94 MMHG

## 2021-06-19 DIAGNOSIS — M25.571 CHRONIC PAIN OF RIGHT ANKLE: Primary | ICD-10-CM

## 2021-06-19 DIAGNOSIS — F41.9 ANXIETY: ICD-10-CM

## 2021-06-19 DIAGNOSIS — G89.29 CHRONIC PAIN OF RIGHT ANKLE: Primary | ICD-10-CM

## 2021-06-19 PROCEDURE — 99284 EMERGENCY DEPT VISIT MOD MDM: CPT | Mod: ER

## 2021-06-19 RX ORDER — HYDROXYZINE HYDROCHLORIDE 25 MG/1
25 TABLET, FILM COATED ORAL 3 TIMES DAILY PRN
Qty: 20 TABLET | Refills: 0 | Status: ON HOLD | OUTPATIENT
Start: 2021-06-19 | End: 2021-07-16 | Stop reason: HOSPADM

## 2021-06-19 RX ORDER — NAPROXEN 500 MG/1
500 TABLET ORAL 2 TIMES DAILY PRN
Qty: 20 TABLET | Refills: 0 | OUTPATIENT
Start: 2021-06-19 | End: 2021-06-20

## 2021-06-20 ENCOUNTER — HOSPITAL ENCOUNTER (EMERGENCY)
Facility: HOSPITAL | Age: 31
Discharge: HOME | End: 2021-06-20
Attending: EMERGENCY MEDICINE
Payer: MEDICAID

## 2021-06-20 VITALS
BODY MASS INDEX: 25.97 KG/M2 | WEIGHT: 165.81 LBS | TEMPERATURE: 98 F | OXYGEN SATURATION: 97 % | DIASTOLIC BLOOD PRESSURE: 83 MMHG | SYSTOLIC BLOOD PRESSURE: 143 MMHG | RESPIRATION RATE: 18 BRPM | HEART RATE: 115 BPM

## 2021-06-20 DIAGNOSIS — L03.114 CELLULITIS OF HAND, LEFT: ICD-10-CM

## 2021-06-20 DIAGNOSIS — M79.642 LEFT HAND PAIN: Primary | ICD-10-CM

## 2021-06-20 PROCEDURE — 99284 EMERGENCY DEPT VISIT MOD MDM: CPT | Mod: ER

## 2021-06-20 RX ORDER — NAPROXEN 500 MG/1
500 TABLET ORAL 2 TIMES DAILY WITH MEALS
Qty: 60 TABLET | Refills: 0 | Status: SHIPPED | OUTPATIENT
Start: 2021-06-20 | End: 2021-09-01

## 2021-06-20 RX ORDER — SULFAMETHOXAZOLE AND TRIMETHOPRIM 800; 160 MG/1; MG/1
1 TABLET ORAL 2 TIMES DAILY
Qty: 14 TABLET | Refills: 0 | Status: SHIPPED | OUTPATIENT
Start: 2021-06-20 | End: 2021-06-27

## 2021-07-01 ENCOUNTER — PATIENT MESSAGE (OUTPATIENT)
Dept: ADMINISTRATIVE | Facility: OTHER | Age: 31
End: 2021-07-01

## 2021-07-09 ENCOUNTER — HOSPITAL ENCOUNTER (OUTPATIENT)
Dept: CARDIOLOGY | Facility: HOSPITAL | Age: 31
Discharge: HOME | End: 2021-07-09
Payer: MEDICAID

## 2021-07-09 DIAGNOSIS — R00.0 TACHYCARDIA, UNSPECIFIED: ICD-10-CM

## 2021-07-09 DIAGNOSIS — F90.0 ADHD, PREDOMINANTLY INATTENTIVE TYPE: Primary | ICD-10-CM

## 2021-07-09 DIAGNOSIS — F90.0 ADHD, PREDOMINANTLY INATTENTIVE TYPE: ICD-10-CM

## 2021-07-09 PROCEDURE — 93010 EKG 12-LEAD: ICD-10-PCS | Mod: ,,, | Performed by: INTERNAL MEDICINE

## 2021-07-09 PROCEDURE — 93010 ELECTROCARDIOGRAM REPORT: CPT | Mod: ,,, | Performed by: INTERNAL MEDICINE

## 2021-07-09 PROCEDURE — 93005 ELECTROCARDIOGRAM TRACING: CPT | Mod: PO

## 2021-07-12 ENCOUNTER — HOSPITAL ENCOUNTER (EMERGENCY)
Facility: HOSPITAL | Age: 31
Discharge: PSYCHIATRIC OR SUBSTANCE USE FACILITY | End: 2021-07-12
Attending: EMERGENCY MEDICINE
Payer: MEDICAID

## 2021-07-12 VITALS
TEMPERATURE: 98 F | OXYGEN SATURATION: 97 % | DIASTOLIC BLOOD PRESSURE: 83 MMHG | RESPIRATION RATE: 18 BRPM | WEIGHT: 165.38 LBS | HEART RATE: 105 BPM | BODY MASS INDEX: 25.96 KG/M2 | HEIGHT: 67 IN | SYSTOLIC BLOOD PRESSURE: 139 MMHG

## 2021-07-12 DIAGNOSIS — F15.10 STIMULANT ABUSE: ICD-10-CM

## 2021-07-12 DIAGNOSIS — F10.920 ALCOHOLIC INTOXICATION WITHOUT COMPLICATION: ICD-10-CM

## 2021-07-12 DIAGNOSIS — F32.A DEPRESSION, UNSPECIFIED DEPRESSION TYPE: ICD-10-CM

## 2021-07-12 DIAGNOSIS — F23 ACUTE PSYCHOSIS: Primary | ICD-10-CM

## 2021-07-12 PROBLEM — F29 PSYCHOSIS: Status: ACTIVE | Noted: 2021-07-12

## 2021-07-12 LAB
ALBUMIN SERPL BCP-MCNC: 4.4 G/DL (ref 3.5–5.2)
ALP SERPL-CCNC: 104 U/L (ref 55–135)
ALT SERPL W/O P-5'-P-CCNC: 20 U/L (ref 10–44)
AMPHET+METHAMPHET UR QL: ABNORMAL
ANION GAP SERPL CALC-SCNC: 11 MMOL/L (ref 8–16)
APAP SERPL-MCNC: <3 UG/ML (ref 10–20)
AST SERPL-CCNC: 28 U/L (ref 10–40)
BARBITURATES UR QL SCN>200 NG/ML: NEGATIVE
BASOPHILS # BLD AUTO: 0.02 K/UL (ref 0–0.2)
BASOPHILS NFR BLD: 0.2 % (ref 0–1.9)
BENZODIAZ UR QL SCN>200 NG/ML: NEGATIVE
BILIRUB SERPL-MCNC: 0.2 MG/DL (ref 0.1–1)
BILIRUB UR QL STRIP: NEGATIVE
BUN SERPL-MCNC: 7 MG/DL (ref 6–20)
BZE UR QL SCN: NEGATIVE
CALCIUM SERPL-MCNC: 8.9 MG/DL (ref 8.7–10.5)
CANNABINOIDS UR QL SCN: NEGATIVE
CHLORIDE SERPL-SCNC: 106 MMOL/L (ref 95–110)
CK SERPL-CCNC: 576 U/L (ref 20–200)
CLARITY UR REFRACT.AUTO: CLEAR
CO2 SERPL-SCNC: 24 MMOL/L (ref 23–29)
COLOR UR AUTO: YELLOW
CREAT SERPL-MCNC: 1 MG/DL (ref 0.5–1.4)
CREAT UR-MCNC: 98.8 MG/DL (ref 23–375)
CTP QC/QA: YES
DIFFERENTIAL METHOD: NORMAL
EOSINOPHIL # BLD AUTO: 0.1 K/UL (ref 0–0.5)
EOSINOPHIL NFR BLD: 0.7 % (ref 0–8)
ERYTHROCYTE [DISTWIDTH] IN BLOOD BY AUTOMATED COUNT: 12.4 % (ref 11.5–14.5)
EST. GFR  (AFRICAN AMERICAN): >60 ML/MIN/1.73 M^2
EST. GFR  (NON AFRICAN AMERICAN): >60 ML/MIN/1.73 M^2
ETHANOL SERPL-MCNC: 156 MG/DL
ETHANOL SERPL-MCNC: 86 MG/DL
GLUCOSE SERPL-MCNC: 101 MG/DL (ref 70–110)
GLUCOSE UR QL STRIP: NEGATIVE
HCT VFR BLD AUTO: 43.8 % (ref 40–54)
HGB BLD-MCNC: 14.9 G/DL (ref 14–18)
HGB UR QL STRIP: NEGATIVE
IMM GRANULOCYTES # BLD AUTO: 0.03 K/UL (ref 0–0.04)
IMM GRANULOCYTES NFR BLD AUTO: 0.3 % (ref 0–0.5)
KETONES UR QL STRIP: NEGATIVE
LEUKOCYTE ESTERASE UR QL STRIP: NEGATIVE
LYMPHOCYTES # BLD AUTO: 1.9 K/UL (ref 1–4.8)
LYMPHOCYTES NFR BLD: 20.8 % (ref 18–48)
MCH RBC QN AUTO: 29.1 PG (ref 27–31)
MCHC RBC AUTO-ENTMCNC: 34 G/DL (ref 32–36)
MCV RBC AUTO: 86 FL (ref 82–98)
METHADONE UR QL SCN>300 NG/ML: NEGATIVE
MONOCYTES # BLD AUTO: 0.5 K/UL (ref 0.3–1)
MONOCYTES NFR BLD: 6 % (ref 4–15)
NEUTROPHILS # BLD AUTO: 6.5 K/UL (ref 1.8–7.7)
NEUTROPHILS NFR BLD: 72 % (ref 38–73)
NITRITE UR QL STRIP: NEGATIVE
NRBC BLD-RTO: 0 /100 WBC
OPIATES UR QL SCN: NEGATIVE
PCP UR QL SCN>25 NG/ML: NEGATIVE
PH UR STRIP: 6 [PH] (ref 5–8)
PLATELET # BLD AUTO: 420 K/UL (ref 150–450)
PMV BLD AUTO: 9.9 FL (ref 9.2–12.9)
POTASSIUM SERPL-SCNC: 3.7 MMOL/L (ref 3.5–5.1)
PROT SERPL-MCNC: 7.6 G/DL (ref 6–8.4)
PROT UR QL STRIP: NEGATIVE
RBC # BLD AUTO: 5.12 M/UL (ref 4.6–6.2)
SALICYLATES SERPL-MCNC: <5 MG/DL (ref 15–30)
SARS-COV-2 RDRP RESP QL NAA+PROBE: NEGATIVE
SODIUM SERPL-SCNC: 141 MMOL/L (ref 136–145)
SP GR UR STRIP: 1.02 (ref 1–1.03)
TOXICOLOGY INFORMATION: ABNORMAL
TSH SERPL DL<=0.005 MIU/L-ACNC: 1.03 UIU/ML (ref 0.4–4)
URN SPEC COLLECT METH UR: NORMAL
UROBILINOGEN UR STRIP-ACNC: NEGATIVE EU/DL
WBC # BLD AUTO: 8.97 K/UL (ref 3.9–12.7)

## 2021-07-12 PROCEDURE — 96361 HYDRATE IV INFUSION ADD-ON: CPT | Mod: ER

## 2021-07-12 PROCEDURE — 84443 ASSAY THYROID STIM HORMONE: CPT | Mod: ER | Performed by: EMERGENCY MEDICINE

## 2021-07-12 PROCEDURE — U0002 COVID-19 LAB TEST NON-CDC: HCPCS | Mod: ER | Performed by: EMERGENCY MEDICINE

## 2021-07-12 PROCEDURE — 82550 ASSAY OF CK (CPK): CPT | Mod: ER | Performed by: EMERGENCY MEDICINE

## 2021-07-12 PROCEDURE — 80143 DRUG ASSAY ACETAMINOPHEN: CPT | Mod: ER | Performed by: EMERGENCY MEDICINE

## 2021-07-12 PROCEDURE — 96374 THER/PROPH/DIAG INJ IV PUSH: CPT | Mod: ER

## 2021-07-12 PROCEDURE — 80053 COMPREHEN METABOLIC PANEL: CPT | Mod: ER | Performed by: EMERGENCY MEDICINE

## 2021-07-12 PROCEDURE — 82077 ASSAY SPEC XCP UR&BREATH IA: CPT | Mod: 91,ER | Performed by: EMERGENCY MEDICINE

## 2021-07-12 PROCEDURE — 25000003 PHARM REV CODE 250: Mod: ER | Performed by: EMERGENCY MEDICINE

## 2021-07-12 PROCEDURE — 80179 DRUG ASSAY SALICYLATE: CPT | Mod: ER | Performed by: EMERGENCY MEDICINE

## 2021-07-12 PROCEDURE — 99285 EMERGENCY DEPT VISIT HI MDM: CPT | Mod: 25,ER

## 2021-07-12 PROCEDURE — 80307 DRUG TEST PRSMV CHEM ANLYZR: CPT | Mod: ER | Performed by: EMERGENCY MEDICINE

## 2021-07-12 PROCEDURE — 81003 URINALYSIS AUTO W/O SCOPE: CPT | Mod: ER,59 | Performed by: EMERGENCY MEDICINE

## 2021-07-12 PROCEDURE — 63600175 PHARM REV CODE 636 W HCPCS: Mod: ER | Performed by: EMERGENCY MEDICINE

## 2021-07-12 PROCEDURE — 85025 COMPLETE CBC W/AUTO DIFF WBC: CPT | Mod: ER | Performed by: EMERGENCY MEDICINE

## 2021-07-12 RX ORDER — ACETAMINOPHEN 325 MG/1
650 TABLET ORAL
Status: COMPLETED | OUTPATIENT
Start: 2021-07-12 | End: 2021-07-12

## 2021-07-12 RX ADMIN — SODIUM CHLORIDE 1000 ML: 0.9 INJECTION, SOLUTION INTRAVENOUS at 05:07

## 2021-07-12 RX ADMIN — ACETAMINOPHEN 650 MG: 325 TABLET ORAL at 04:07

## 2021-07-12 RX ADMIN — SODIUM CHLORIDE 1000 ML: 0.9 INJECTION, SOLUTION INTRAVENOUS at 02:07

## 2021-07-12 RX ADMIN — LORAZEPAM 1 MG: 2 INJECTION INTRAMUSCULAR; INTRAVENOUS at 02:07

## 2021-07-13 PROBLEM — Z13.9 ENCOUNTER FOR MEDICAL SCREENING EXAMINATION: Status: ACTIVE | Noted: 2021-07-13

## 2021-07-13 PROBLEM — R74.8 ELEVATED CPK: Status: ACTIVE | Noted: 2021-07-13

## 2021-07-16 PROBLEM — F19.959 SUBSTANCE-INDUCED PSYCHOTIC DISORDER: Status: ACTIVE | Noted: 2021-07-12

## 2021-07-16 PROBLEM — F33.1 MDD (MAJOR DEPRESSIVE DISORDER), RECURRENT EPISODE, MODERATE: Status: ACTIVE | Noted: 2021-07-16

## 2021-07-16 PROBLEM — F15.10 AMPHETAMINE ABUSE: Status: ACTIVE | Noted: 2021-07-16

## 2021-09-01 ENCOUNTER — HOSPITAL ENCOUNTER (EMERGENCY)
Facility: HOSPITAL | Age: 31
Discharge: HOME | End: 2021-09-01
Attending: EMERGENCY MEDICINE
Payer: MEDICAID

## 2021-09-01 VITALS
TEMPERATURE: 98 F | BODY MASS INDEX: 25.76 KG/M2 | OXYGEN SATURATION: 100 % | RESPIRATION RATE: 13 BRPM | DIASTOLIC BLOOD PRESSURE: 92 MMHG | WEIGHT: 170 LBS | SYSTOLIC BLOOD PRESSURE: 166 MMHG | HEIGHT: 68 IN | HEART RATE: 107 BPM

## 2021-09-01 VITALS
OXYGEN SATURATION: 100 % | BODY MASS INDEX: 25.76 KG/M2 | RESPIRATION RATE: 18 BRPM | HEIGHT: 68 IN | WEIGHT: 170 LBS | TEMPERATURE: 98 F | DIASTOLIC BLOOD PRESSURE: 92 MMHG | SYSTOLIC BLOOD PRESSURE: 147 MMHG | HEART RATE: 100 BPM

## 2021-09-01 DIAGNOSIS — Z20.822 LAB TEST NEGATIVE FOR COVID-19 VIRUS: ICD-10-CM

## 2021-09-01 DIAGNOSIS — F41.0 PANIC ATTACK: ICD-10-CM

## 2021-09-01 DIAGNOSIS — Z76.0 ENCOUNTER FOR MEDICATION REFILL: Primary | ICD-10-CM

## 2021-09-01 DIAGNOSIS — F41.9 ANXIETY: Primary | ICD-10-CM

## 2021-09-01 DIAGNOSIS — F41.9 ANXIETY: ICD-10-CM

## 2021-09-01 LAB
AMPHET+METHAMPHET UR QL: ABNORMAL
BARBITURATES UR QL SCN>200 NG/ML: NEGATIVE
BENZODIAZ UR QL SCN>200 NG/ML: NEGATIVE
BZE UR QL SCN: NEGATIVE
CANNABINOIDS UR QL SCN: NEGATIVE
CREAT UR-MCNC: 12.1 MG/DL (ref 23–375)
CTP QC/QA: YES
ETHANOL SERPL-MCNC: <10 MG/DL
HCV AB SERPL QL IA: NEGATIVE
HIV 1+2 AB+HIV1 P24 AG SERPL QL IA: NEGATIVE
METHADONE UR QL SCN>300 NG/ML: NEGATIVE
OPIATES UR QL SCN: NEGATIVE
PCP UR QL SCN>25 NG/ML: NEGATIVE
SARS-COV-2 RDRP RESP QL NAA+PROBE: NEGATIVE
TOXICOLOGY INFORMATION: ABNORMAL

## 2021-09-01 PROCEDURE — 99284 EMERGENCY DEPT VISIT MOD MDM: CPT | Mod: 25,27,ER

## 2021-09-01 PROCEDURE — 86803 HEPATITIS C AB TEST: CPT | Performed by: EMERGENCY MEDICINE

## 2021-09-01 PROCEDURE — U0002 COVID-19 LAB TEST NON-CDC: HCPCS | Mod: ER | Performed by: EMERGENCY MEDICINE

## 2021-09-01 PROCEDURE — 80307 DRUG TEST PRSMV CHEM ANLYZR: CPT | Mod: ER | Performed by: EMERGENCY MEDICINE

## 2021-09-01 PROCEDURE — 82077 ASSAY SPEC XCP UR&BREATH IA: CPT | Mod: ER | Performed by: EMERGENCY MEDICINE

## 2021-09-01 PROCEDURE — 99283 EMERGENCY DEPT VISIT LOW MDM: CPT | Mod: ER

## 2021-09-01 PROCEDURE — 96372 THER/PROPH/DIAG INJ SC/IM: CPT | Mod: ER

## 2021-09-01 PROCEDURE — 63600175 PHARM REV CODE 636 W HCPCS: Mod: ER | Performed by: EMERGENCY MEDICINE

## 2021-09-01 PROCEDURE — 87389 HIV-1 AG W/HIV-1&-2 AB AG IA: CPT | Performed by: EMERGENCY MEDICINE

## 2021-09-01 RX ORDER — HYDROXYZINE PAMOATE 25 MG/1
25 CAPSULE ORAL EVERY 4 HOURS PRN
Qty: 12 CAPSULE | Refills: 0 | Status: SHIPPED | OUTPATIENT
Start: 2021-09-01

## 2021-09-01 RX ORDER — PANTOPRAZOLE SODIUM 40 MG/1
40 TABLET, DELAYED RELEASE ORAL DAILY
Qty: 14 TABLET | Refills: 0 | Status: ON HOLD | OUTPATIENT
Start: 2021-09-01 | End: 2022-04-03 | Stop reason: HOSPADM

## 2021-09-01 RX ADMIN — LORAZEPAM 1 MG: 2 INJECTION INTRAMUSCULAR; INTRAVENOUS at 12:09

## 2021-09-02 LAB — HEP C VIRUS HOLD SPECIMEN: NORMAL

## 2021-09-07 ENCOUNTER — HOSPITAL ENCOUNTER (EMERGENCY)
Facility: HOSPITAL | Age: 31
Discharge: HOME | End: 2021-09-07
Attending: FAMILY MEDICINE
Payer: MEDICAID

## 2021-09-07 ENCOUNTER — HOSPITAL ENCOUNTER (EMERGENCY)
Facility: HOSPITAL | Age: 31
Discharge: HOME | End: 2021-09-07
Attending: EMERGENCY MEDICINE
Payer: MEDICAID

## 2021-09-07 VITALS
BODY MASS INDEX: 25.9 KG/M2 | HEIGHT: 67 IN | OXYGEN SATURATION: 96 % | DIASTOLIC BLOOD PRESSURE: 84 MMHG | RESPIRATION RATE: 18 BRPM | WEIGHT: 165 LBS | HEART RATE: 95 BPM | SYSTOLIC BLOOD PRESSURE: 135 MMHG | TEMPERATURE: 99 F

## 2021-09-07 VITALS
TEMPERATURE: 98 F | HEIGHT: 67 IN | RESPIRATION RATE: 18 BRPM | WEIGHT: 159.63 LBS | SYSTOLIC BLOOD PRESSURE: 127 MMHG | DIASTOLIC BLOOD PRESSURE: 81 MMHG | HEART RATE: 90 BPM | BODY MASS INDEX: 25.06 KG/M2 | OXYGEN SATURATION: 98 %

## 2021-09-07 DIAGNOSIS — K52.9 GASTROENTERITIS: Primary | ICD-10-CM

## 2021-09-07 DIAGNOSIS — T50.905A ADVERSE EFFECT OF DRUG, INITIAL ENCOUNTER: Primary | ICD-10-CM

## 2021-09-07 LAB
CTP QC/QA: YES
SARS-COV-2 RDRP RESP QL NAA+PROBE: NEGATIVE

## 2021-09-07 PROCEDURE — 25000003 PHARM REV CODE 250: Mod: ER | Performed by: EMERGENCY MEDICINE

## 2021-09-07 PROCEDURE — 99283 EMERGENCY DEPT VISIT LOW MDM: CPT | Mod: 25,27,ER

## 2021-09-07 PROCEDURE — 99282 EMERGENCY DEPT VISIT SF MDM: CPT | Mod: ER

## 2021-09-07 PROCEDURE — U0002 COVID-19 LAB TEST NON-CDC: HCPCS | Mod: ER | Performed by: EMERGENCY MEDICINE

## 2021-09-07 RX ORDER — ONDANSETRON 4 MG/1
4 TABLET, ORALLY DISINTEGRATING ORAL
Status: COMPLETED | OUTPATIENT
Start: 2021-09-07 | End: 2021-09-07

## 2021-09-07 RX ORDER — ONDANSETRON 4 MG/1
4 TABLET, FILM COATED ORAL EVERY 8 HOURS PRN
Qty: 12 TABLET | Refills: 0 | Status: ON HOLD | OUTPATIENT
Start: 2021-09-07 | End: 2022-04-03 | Stop reason: HOSPADM

## 2021-09-07 RX ADMIN — ONDANSETRON 4 MG: 4 TABLET, ORALLY DISINTEGRATING ORAL at 12:09

## 2021-09-20 ENCOUNTER — HOSPITAL ENCOUNTER (EMERGENCY)
Facility: HOSPITAL | Age: 31
Discharge: HOME | End: 2021-09-20
Attending: EMERGENCY MEDICINE
Payer: MEDICAID

## 2021-09-20 VITALS
TEMPERATURE: 98 F | SYSTOLIC BLOOD PRESSURE: 144 MMHG | WEIGHT: 160.94 LBS | RESPIRATION RATE: 20 BRPM | DIASTOLIC BLOOD PRESSURE: 95 MMHG | HEART RATE: 96 BPM | BODY MASS INDEX: 25.26 KG/M2 | OXYGEN SATURATION: 96 % | HEIGHT: 67 IN

## 2021-09-20 DIAGNOSIS — Z76.0 ENCOUNTER FOR MEDICATION REFILL: Primary | ICD-10-CM

## 2021-09-20 PROCEDURE — 99281 EMR DPT VST MAYX REQ PHY/QHP: CPT | Mod: ER

## 2021-10-07 ENCOUNTER — HOSPITAL ENCOUNTER (EMERGENCY)
Facility: HOSPITAL | Age: 31
Discharge: PSYCHIATRIC OR SUBSTANCE USE FACILITY | End: 2021-10-07
Attending: EMERGENCY MEDICINE
Payer: MEDICAID

## 2021-10-07 VITALS
BODY MASS INDEX: 26.63 KG/M2 | HEART RATE: 80 BPM | SYSTOLIC BLOOD PRESSURE: 120 MMHG | TEMPERATURE: 98 F | DIASTOLIC BLOOD PRESSURE: 76 MMHG | RESPIRATION RATE: 16 BRPM | WEIGHT: 170 LBS | OXYGEN SATURATION: 98 %

## 2021-10-07 DIAGNOSIS — Z00.8 MEDICAL CLEARANCE FOR PSYCHIATRIC ADMISSION: ICD-10-CM

## 2021-10-07 DIAGNOSIS — Z91.199 MEDICALLY NONCOMPLIANT: ICD-10-CM

## 2021-10-07 DIAGNOSIS — F23 ACUTE PSYCHOSIS: ICD-10-CM

## 2021-10-07 DIAGNOSIS — F19.11 HISTORY OF SUBSTANCE ABUSE: ICD-10-CM

## 2021-10-07 DIAGNOSIS — R44.0 AUDITORY HALLUCINATIONS: Primary | ICD-10-CM

## 2021-10-07 LAB
ALBUMIN SERPL BCP-MCNC: 4.1 G/DL (ref 3.5–5.2)
ALP SERPL-CCNC: 82 U/L (ref 55–135)
ALT SERPL W/O P-5'-P-CCNC: 14 U/L (ref 10–44)
AMPHET+METHAMPHET UR QL: NEGATIVE
ANION GAP SERPL CALC-SCNC: 10 MMOL/L (ref 8–16)
APAP SERPL-MCNC: <3 UG/ML (ref 10–20)
AST SERPL-CCNC: 15 U/L (ref 10–40)
BARBITURATES UR QL SCN>200 NG/ML: NEGATIVE
BASOPHILS # BLD AUTO: 0.03 K/UL (ref 0–0.2)
BASOPHILS NFR BLD: 0.5 % (ref 0–1.9)
BENZODIAZ UR QL SCN>200 NG/ML: NEGATIVE
BILIRUB SERPL-MCNC: 0.5 MG/DL (ref 0.1–1)
BILIRUB UR QL STRIP: NEGATIVE
BUN SERPL-MCNC: 8 MG/DL (ref 6–20)
BZE UR QL SCN: NEGATIVE
CALCIUM SERPL-MCNC: 8.9 MG/DL (ref 8.7–10.5)
CANNABINOIDS UR QL SCN: NEGATIVE
CHLORIDE SERPL-SCNC: 107 MMOL/L (ref 95–110)
CLARITY UR REFRACT.AUTO: CLEAR
CO2 SERPL-SCNC: 24 MMOL/L (ref 23–29)
COLOR UR AUTO: YELLOW
CREAT SERPL-MCNC: 1 MG/DL (ref 0.5–1.4)
CREAT UR-MCNC: 179.4 MG/DL (ref 23–375)
CTP QC/QA: YES
DIFFERENTIAL METHOD: NORMAL
EOSINOPHIL # BLD AUTO: 0.2 K/UL (ref 0–0.5)
EOSINOPHIL NFR BLD: 3.5 % (ref 0–8)
ERYTHROCYTE [DISTWIDTH] IN BLOOD BY AUTOMATED COUNT: 12.3 % (ref 11.5–14.5)
EST. GFR  (AFRICAN AMERICAN): >60 ML/MIN/1.73 M^2
EST. GFR  (NON AFRICAN AMERICAN): >60 ML/MIN/1.73 M^2
ETHANOL SERPL-MCNC: <10 MG/DL
GLUCOSE SERPL-MCNC: 74 MG/DL (ref 70–110)
GLUCOSE UR QL STRIP: NEGATIVE
HCT VFR BLD AUTO: 46 % (ref 40–54)
HGB BLD-MCNC: 15.7 G/DL (ref 14–18)
HGB UR QL STRIP: NEGATIVE
IMM GRANULOCYTES # BLD AUTO: 0.03 K/UL (ref 0–0.04)
IMM GRANULOCYTES NFR BLD AUTO: 0.5 % (ref 0–0.5)
KETONES UR QL STRIP: NEGATIVE
LEUKOCYTE ESTERASE UR QL STRIP: NEGATIVE
LYMPHOCYTES # BLD AUTO: 1.6 K/UL (ref 1–4.8)
LYMPHOCYTES NFR BLD: 27.1 % (ref 18–48)
MCH RBC QN AUTO: 30 PG (ref 27–31)
MCHC RBC AUTO-ENTMCNC: 34.1 G/DL (ref 32–36)
MCV RBC AUTO: 88 FL (ref 82–98)
METHADONE UR QL SCN>300 NG/ML: NEGATIVE
MONOCYTES # BLD AUTO: 0.6 K/UL (ref 0.3–1)
MONOCYTES NFR BLD: 9.6 % (ref 4–15)
NEUTROPHILS # BLD AUTO: 3.4 K/UL (ref 1.8–7.7)
NEUTROPHILS NFR BLD: 58.8 % (ref 38–73)
NITRITE UR QL STRIP: NEGATIVE
NRBC BLD-RTO: 0 /100 WBC
OPIATES UR QL SCN: NEGATIVE
PCP UR QL SCN>25 NG/ML: NEGATIVE
PH UR STRIP: 6 [PH] (ref 5–8)
PLATELET # BLD AUTO: 333 K/UL (ref 150–450)
PMV BLD AUTO: 10.5 FL (ref 9.2–12.9)
POTASSIUM SERPL-SCNC: 4.2 MMOL/L (ref 3.5–5.1)
PROT SERPL-MCNC: 7 G/DL (ref 6–8.4)
PROT UR QL STRIP: NEGATIVE
RBC # BLD AUTO: 5.24 M/UL (ref 4.6–6.2)
SALICYLATES SERPL-MCNC: <5 MG/DL (ref 15–30)
SARS-COV-2 RDRP RESP QL NAA+PROBE: NEGATIVE
SODIUM SERPL-SCNC: 141 MMOL/L (ref 136–145)
SP GR UR STRIP: 1.02 (ref 1–1.03)
TOXICOLOGY INFORMATION: NORMAL
TSH SERPL DL<=0.005 MIU/L-ACNC: 1.41 UIU/ML (ref 0.4–4)
URN SPEC COLLECT METH UR: NORMAL
UROBILINOGEN UR STRIP-ACNC: NEGATIVE EU/DL
WBC # BLD AUTO: 5.71 K/UL (ref 3.9–12.7)

## 2021-10-07 PROCEDURE — 80053 COMPREHEN METABOLIC PANEL: CPT | Mod: ER | Performed by: EMERGENCY MEDICINE

## 2021-10-07 PROCEDURE — 80179 DRUG ASSAY SALICYLATE: CPT | Mod: ER | Performed by: EMERGENCY MEDICINE

## 2021-10-07 PROCEDURE — 84443 ASSAY THYROID STIM HORMONE: CPT | Mod: ER | Performed by: EMERGENCY MEDICINE

## 2021-10-07 PROCEDURE — 80143 DRUG ASSAY ACETAMINOPHEN: CPT | Mod: ER | Performed by: EMERGENCY MEDICINE

## 2021-10-07 PROCEDURE — 85025 COMPLETE CBC W/AUTO DIFF WBC: CPT | Mod: ER | Performed by: EMERGENCY MEDICINE

## 2021-10-07 PROCEDURE — 82077 ASSAY SPEC XCP UR&BREATH IA: CPT | Mod: ER | Performed by: EMERGENCY MEDICINE

## 2021-10-07 PROCEDURE — U0002 COVID-19 LAB TEST NON-CDC: HCPCS | Mod: ER | Performed by: EMERGENCY MEDICINE

## 2021-10-07 PROCEDURE — 25000003 PHARM REV CODE 250: Mod: ER | Performed by: FAMILY MEDICINE

## 2021-10-07 PROCEDURE — 81003 URINALYSIS AUTO W/O SCOPE: CPT | Mod: 59,ER | Performed by: EMERGENCY MEDICINE

## 2021-10-07 PROCEDURE — 99285 EMERGENCY DEPT VISIT HI MDM: CPT | Mod: ER

## 2021-10-07 PROCEDURE — 80307 DRUG TEST PRSMV CHEM ANLYZR: CPT | Mod: ER | Performed by: EMERGENCY MEDICINE

## 2021-10-07 PROCEDURE — S4991 NICOTINE PATCH NONLEGEND: HCPCS | Mod: ER | Performed by: FAMILY MEDICINE

## 2021-10-07 RX ORDER — IBUPROFEN 200 MG
1 TABLET ORAL
Status: DISCONTINUED | OUTPATIENT
Start: 2021-10-07 | End: 2021-10-07 | Stop reason: HOSPADM

## 2021-10-07 RX ADMIN — MAGNESIUM HYDROXIDE/ALUMINUM HYDROXICE/SIMETHICONE 50 ML: 120; 1200; 1200 SUSPENSION ORAL at 07:10

## 2021-10-07 RX ADMIN — Medication 1 PATCH: at 06:10

## 2021-10-18 PROBLEM — Z13.9 ENCOUNTER FOR MEDICAL SCREENING EXAMINATION: Status: RESOLVED | Noted: 2021-07-13 | Resolved: 2021-10-18

## 2022-01-30 ENCOUNTER — HOSPITAL ENCOUNTER (EMERGENCY)
Facility: HOSPITAL | Age: 32
Discharge: HOME | End: 2022-01-30
Attending: EMERGENCY MEDICINE
Payer: MEDICAID

## 2022-01-30 VITALS
RESPIRATION RATE: 22 BRPM | HEART RATE: 104 BPM | TEMPERATURE: 99 F | DIASTOLIC BLOOD PRESSURE: 74 MMHG | OXYGEN SATURATION: 98 % | BODY MASS INDEX: 24.3 KG/M2 | WEIGHT: 169.75 LBS | HEIGHT: 70 IN | SYSTOLIC BLOOD PRESSURE: 142 MMHG

## 2022-01-30 DIAGNOSIS — Z53.21 ELOPED FROM EMERGENCY DEPARTMENT: Primary | ICD-10-CM

## 2022-01-30 PROCEDURE — 99283 EMERGENCY DEPT VISIT LOW MDM: CPT | Mod: ER

## 2022-01-30 RX ORDER — AMITRIPTYLINE HYDROCHLORIDE 50 MG/1
50 TABLET, FILM COATED ORAL DAILY
Status: ON HOLD | COMMUNITY
Start: 2021-09-14 | End: 2022-04-03 | Stop reason: HOSPADM

## 2022-01-30 RX ORDER — BUSPIRONE HYDROCHLORIDE 10 MG/1
10 TABLET ORAL 2 TIMES DAILY
Status: ON HOLD | COMMUNITY
Start: 2021-10-26 | End: 2022-04-03 | Stop reason: HOSPADM

## 2022-01-30 RX ORDER — CLONIDINE HYDROCHLORIDE 0.1 MG/1
0.1 TABLET ORAL NIGHTLY
Status: ON HOLD | COMMUNITY
Start: 2022-01-05 | End: 2022-04-03 | Stop reason: HOSPADM

## 2022-01-30 NOTE — ED PROVIDER NOTES
2:17 PM     Patient reportedly came to emergency department with addiction problem.  Patient reportedly eloped from the ED before I could evaluate patient.  When patient was in the hallway, he was awake, alert, oriented.  [facility] stated that he was not suicidal or homicidal.    Patient given resources for outpatient drug addiction by nursing staff      ICD-10-CM ICD-9-CM   1. Eloped from emergency department  Z53.21 V64.2          Justina Chambers,   01/30/22 1418

## 2022-02-08 ENCOUNTER — HOSPITAL ENCOUNTER (EMERGENCY)
Facility: HOSPITAL | Age: 32
Discharge: HOME | End: 2022-02-08
Attending: EMERGENCY MEDICINE
Payer: MEDICAID

## 2022-02-08 VITALS
TEMPERATURE: 98 F | HEART RATE: 122 BPM | OXYGEN SATURATION: 98 % | BODY MASS INDEX: 25.37 KG/M2 | RESPIRATION RATE: 19 BRPM | SYSTOLIC BLOOD PRESSURE: 126 MMHG | DIASTOLIC BLOOD PRESSURE: 83 MMHG | HEIGHT: 67 IN | WEIGHT: 161.63 LBS

## 2022-02-08 DIAGNOSIS — L85.3 DRY SKIN DERMATITIS: Primary | ICD-10-CM

## 2022-02-08 PROCEDURE — 99282 EMERGENCY DEPT VISIT SF MDM: CPT | Mod: ER

## 2022-02-08 NOTE — ED PROVIDER NOTES
"Encounter Date: 2/8/2022       History     Chief Complaint   Patient presents with    Rash     Rash that appeared today on his hands     The history is provided by the patient.   Rash   This is a new problem. The current episode started today. Associated with: washing hands "a lot" Affected Location: dorsum of hands bilaterally. The pain is at a severity of 0/10. The pain has been constant since onset. Associated symptoms include itching. Associated symptoms comments: Dry, cracking. He has tried nothing for the symptoms. The treatment provided no relief.     Review of patient's allergies indicates:   Allergen Reactions    Amoxicillin     Penicillins Other (See Comments)     Not sure     Past Medical History:   Diagnosis Date    Anxiety about health     Arthritis     Chronic pain     ETOH abuse     Neuropathy      Past Surgical History:   Procedure Laterality Date    ANKLE SURGERY      Right    HIP SURGERY      PELVIC SYMPHYSIS FUSION       History reviewed. No pertinent family history.  Social History     Tobacco Use    Smoking status: Current Every Day Smoker     Packs/day: 1.00     Types: Cigarettes    Smokeless tobacco: Never Used    Tobacco comment: receptive to smoking cessation   Substance Use Topics    Alcohol use: Yes     Comment: "12 pack" a few times a week; ETOH 156    Drug use: Not Currently     Types: Methamphetamines     Comment: Pt denies but UDS + for amphetamines, which Pt stated is due to Adderall Rx     Review of Systems   Constitutional: Negative for fever.   HENT: Negative for sore throat.    Respiratory: Negative for shortness of breath.    Cardiovascular: Negative for chest pain.   Gastrointestinal: Negative for nausea.   Genitourinary: Negative for dysuria.   Musculoskeletal: Negative for back pain.   Skin: Positive for itching and rash.   Neurological: Negative for weakness.   Hematological: Does not bruise/bleed easily.   All other systems reviewed and are " "negative.      Physical Exam     Initial Vitals [02/08/22 0432]   BP Pulse Resp Temp SpO2   126/83 (!) 122 19 97.7 °F (36.5 °C) 98 %      MAP       --         Physical Exam    Nursing note and vitals reviewed.  Constitutional: He appears well-developed and well-nourished. He is not diaphoretic. No distress.   HENT:   Head: Normocephalic and atraumatic.   Eyes: EOM are normal. Pupils are equal, round, and reactive to light. No scleral icterus.   Neck: Neck supple. No thyromegaly present.   Normal range of motion.  Cardiovascular: Normal rate, regular rhythm, normal heart sounds and intact distal pulses. Exam reveals no gallop and no friction rub.    No murmur heard.  Pulmonary/Chest: Breath sounds normal. No respiratory distress. He has no wheezes. He has no rhonchi. He exhibits no tenderness.   Abdominal: Abdomen is soft. Bowel sounds are normal. He exhibits no distension. There is no abdominal tenderness. There is no rebound and no guarding.   Musculoskeletal:         General: No tenderness or edema. Normal range of motion.      Cervical back: Normal range of motion and neck supple.     Lymphadenopathy:     He has no cervical adenopathy.   Neurological: He is alert and oriented to person, place, and time. He has normal strength. No cranial nerve deficit or sensory deficit.   Skin: Skin is warm, dry and intact. Rash (dry skin noted bilaterally.  no overt signs of infection) noted.        Psychiatric: He has a normal mood and affect. His behavior is normal. Judgment and thought content normal.         ED Course   Procedures  Labs Reviewed - No data to display       Imaging Results    None          Medications - No data to display                    Vitals:    02/08/22 0432   BP: 126/83   Pulse: (!) 122   Resp: 19   Temp: 97.7 °F (36.5 °C)   TempSrc: Oral   SpO2: 98%   Weight: 73.3 kg (161 lb 9.6 oz)   Height: 5' 7" (1.702 m)           Imaging Results    None         Medications - No data to display      4:49 AM - " Re-evaluation: The patient is resting comfortably and is in no acute distress. He states that his symptoms have improved after treatment within ER. Discussed shared treatment plan, specific conditions for return, and importance of follow up with patient and family.  Advised close f/u c pcp within one week and to return to ER if symptoms persist or worsen.  He understands and agrees with the plan as discussed. Answered  his questions at this time. He has remained hemodynamically stable throughout the ED course and is appropriate for discharge home.       Pre-hypertension/Hypertension: The pt has been informed that they may have pre-hypertension or hypertension based on a blood pressure reading in the ED. I recommend that the pt call the PCP listed on their discharge instructions or a physician of their choice this week to arrange f/u for further evaluation of possible pre-hypertension or hypertension.     Jared Michael Cowden was given a handout which discussed their disease process, precautions, and instructions for follow-up and therapy.     Follow-up Information     [facility]. Schedule an appointment as soon as possible for a visit in 1 week.    Contact information:  [address]  [place] [id number] [phone]             [facility] - Internal Medicine. Schedule an appointment as soon as possible for a visit in 1 week.    Specialty: Internal Medicine  Contact information:  [address]  [facility] [address] [phone]  Additional information:  Please park in surface lot and check in at main registration.           [facility] Emergency Dept.    Specialty: Emergency Medicine  Contact information:  [address]  [facility] [id number] [phone]                          Medication List      ASK your doctor about these medications    ADDERALL ORAL     amitriptyline 50 MG tablet  Commonly known as: ELAVIL     busPIRone 10 MG tablet  Commonly known as: BUSPAR      citalopram 20 MG tablet  Commonly known as: CeleXA  Take 1 tablet (20 mg total) by mouth once daily.     cloNIDine 0.1 MG tablet  Commonly known as: CATAPRES     folic acid 1 MG tablet  Commonly known as: FOLVITE  Take 1 tablet (1 mg total) by mouth once daily.     hydrOXYzine pamoate 25 MG Cap  Commonly known as: VISTARIL  Take 1 capsule (25 mg total) by mouth every 4 (four) hours as needed (Anxiety).     ondansetron 4 MG tablet  Commonly known as: ZOFRAN  Take 1 tablet (4 mg total) by mouth every 8 (eight) hours as needed.     * pantoprazole 40 MG tablet  Commonly known as: PROTONIX  Take 1 tablet (40 mg total) by mouth once daily.     * pantoprazole 40 MG tablet  Commonly known as: PROTONIX  Take 1 tablet (40 mg total) by mouth once daily.     sildenafil 20 mg Tab  Commonly known as: REVATIO         * This list has 2 medication(s) that are the same as other medications prescribed for you. Read the directions carefully, and ask your doctor or other care provider to review them with you.               Current Discharge Medication List            ED Diagnosis  1. Dry skin dermatitis             Clinical Impression:   Final diagnoses:  [L85.3] Dry skin dermatitis - bilateral hands (Primary)          ED Disposition Condition    Discharge Stable        ED Prescriptions     None        Follow-up Information     Follow up With Specialties Details Why Contact Info Additional Information    [facility]  Schedule an appointment as soon as possible for a visit in 1 week  [address]  [place] [address]  [phone]       [facility] Internal Medicine Internal Medicine Schedule an appointment as soon as possible for a visit in 1 week  [address]  [facility] [id number] [phone] Please park in surface lot and check in at main registration.    [facility] Emergency Dept Emergency Medicine   [address]  [facility] [id number] [phone]            Casey Bacon Jr.,  MD  02/08/22 0450

## 2022-03-19 ENCOUNTER — HOSPITAL ENCOUNTER (EMERGENCY)
Facility: HOSPITAL | Age: 32
Discharge: HOME | End: 2022-03-20
Attending: EMERGENCY MEDICINE
Payer: MEDICAID

## 2022-03-19 VITALS
OXYGEN SATURATION: 96 % | HEART RATE: 120 BPM | RESPIRATION RATE: 18 BRPM | HEIGHT: 67 IN | DIASTOLIC BLOOD PRESSURE: 91 MMHG | WEIGHT: 160.06 LBS | TEMPERATURE: 98 F | BODY MASS INDEX: 25.12 KG/M2 | SYSTOLIC BLOOD PRESSURE: 129 MMHG

## 2022-03-19 DIAGNOSIS — Z76.0 ENCOUNTER FOR MEDICATION REFILL: Primary | ICD-10-CM

## 2022-03-19 PROCEDURE — 99283 EMERGENCY DEPT VISIT LOW MDM: CPT | Mod: ER

## 2022-03-20 PROBLEM — Z76.0 ENCOUNTER FOR MEDICATION REFILL: Status: ACTIVE | Noted: 2022-03-20

## 2022-03-20 PROCEDURE — 25000003 PHARM REV CODE 250: Mod: ER | Performed by: EMERGENCY MEDICINE

## 2022-03-20 RX ORDER — ACETAMINOPHEN 500 MG
1000 TABLET ORAL
Status: COMPLETED | OUTPATIENT
Start: 2022-03-20 | End: 2022-03-20

## 2022-03-20 RX ADMIN — ACETAMINOPHEN 1000 MG: 500 TABLET ORAL at 12:03

## 2022-03-20 NOTE — ED PROVIDER NOTES
"Encounter Date: 3/19/2022       History     Chief Complaint   Patient presents with    Medication Refill     Pt present to ED with concerns of a medication refill, pt states that he is out of his adderall, last taken was aprox 1 week ago, pt denies SI/HI      Pt states he is out of his adderall rx from his pcp and requesting refill. No other issues. Denies any pain.  No other issues    The history is provided by the patient.     Review of patient's allergies indicates:   Allergen Reactions    Amoxicillin     Penicillins Other (See Comments)     Not sure     Past Medical History:   Diagnosis Date    Anxiety about health     Arthritis     Chronic pain     ETOH abuse     Neuropathy      Past Surgical History:   Procedure Laterality Date    ANKLE SURGERY      Right    HIP SURGERY      PELVIC SYMPHYSIS FUSION       No family history on file.  Social History     Tobacco Use    Smoking status: Current Every Day Smoker     Packs/day: 1.00     Types: Cigarettes    Smokeless tobacco: Never Used    Tobacco comment: receptive to smoking cessation   Substance Use Topics    Alcohol use: Yes     Comment: "12 pack" a few times a week; ETOH 156    Drug use: Not Currently     Types: Methamphetamines     Comment: Pt denies but UDS + for amphetamines, which Pt stated is due to Adderall Rx     Review of Systems   Constitutional: Negative for fever.   HENT: Negative for sore throat.    Respiratory: Negative for shortness of breath.    Cardiovascular: Negative for chest pain.   Gastrointestinal: Negative for nausea.   Genitourinary: Negative for dysuria.   Musculoskeletal: Negative for back pain.   Skin: Negative for rash.   Neurological: Negative for weakness.   Hematological: Does not bruise/bleed easily.   All other systems reviewed and are negative.      Physical Exam     Initial Vitals [03/19/22 2057]   BP Pulse Resp Temp SpO2   (!) 129/91 (!) 120 18 97.8 °F (36.6 °C) 96 %      MAP       --         Physical " "Exam    Nursing note and vitals reviewed.  Constitutional: He appears well-developed and well-nourished. He is not diaphoretic. No distress.   HENT:   Head: Normocephalic and atraumatic.   Eyes: EOM are normal. Pupils are equal, round, and reactive to light. No scleral icterus.   Neck: Neck supple. No thyromegaly present.   Normal range of motion.  Cardiovascular: Normal rate, regular rhythm, normal heart sounds and intact distal pulses. Exam reveals no gallop and no friction rub.    No murmur heard.  Pulmonary/Chest: Breath sounds normal. No respiratory distress. He has no wheezes. He has no rhonchi. He exhibits no tenderness.   Abdominal: Abdomen is soft. Bowel sounds are normal. He exhibits no distension. There is no abdominal tenderness. There is no rebound and no guarding.   Musculoskeletal:         General: No tenderness or edema. Normal range of motion.      Cervical back: Normal range of motion and neck supple.     Lymphadenopathy:     He has no cervical adenopathy.   Neurological: He is alert and oriented to person, place, and time. He has normal strength. No cranial nerve deficit or sensory deficit. GCS score is 15. GCS eye subscore is 4. GCS verbal subscore is 5. GCS motor subscore is 6.   Skin: Skin is warm and dry. Capillary refill takes less than 2 seconds.   Psychiatric: He has a normal mood and affect. His behavior is normal. Judgment and thought content normal.         ED Course   Procedures  Labs Reviewed - No data to display       Imaging Results    None          Medications   acetaminophen tablet 1,000 mg (1,000 mg Oral Given 3/20/22 0035)                       Vitals:    03/19/22 2057   BP: (!) 129/91   Pulse: (!) 120   Resp: 18   Temp: 97.8 °F (36.6 °C)   TempSrc: Oral   SpO2: 96%   Weight: 72.6 kg (160 lb 0.9 oz)   Height: 5' 7" (1.702 m)           Imaging Results    None         Medications   acetaminophen tablet 1,000 mg (1,000 mg Oral Given 3/20/22 0035)         12:27 AM - Re-evaluation: " The patient is resting comfortably and is in no acute distress. He states that his symptoms have improved after treatment within ER. Discussed test results, shared treatment plan, specific conditions for return, and importance of follow up with patient and family.  Advised close f/u c pcp within one week and to return to ER if symptoms arise.  He understands and agrees with the plan as discussed. Answered  his questions at this time. He has remained hemodynamically stable throughout the ED course and is appropriate for discharge home.       Pre-hypertension/Hypertension: The pt has been informed that they may have pre-hypertension or hypertension based on a blood pressure reading in the ED. I recommend that the pt call the PCP listed on their discharge instructions or a physician of their choice this week to arrange f/u for further evaluation of possible pre-hypertension or hypertension.     Jared Michael Cowden was given a handout which discussed their disease process, precautions, and instructions for follow-up and therapy.     Follow-up Information     [facility]. Schedule an appointment as soon as possible for a visit in 1 week.    Contact information:  [address]  [place] [address]  [phone]             [facility] - Internal Medicine. Schedule an appointment as soon as possible for a visit in 1 week.    Specialty: Internal Medicine  Contact information:  [address]  [facility] [id number] [phone]  Additional information:  Please park in surface lot and check in at main registration.           [facility] - Emergency Dept.    Specialty: Emergency Medicine  Why: As needed, If symptoms worsen  Contact information:  [address]  [facility] [id number] [phone]                          Medication List      ASK your doctor about these medications    ADDERALL ORAL     amitriptyline 50 MG tablet  Commonly known as: ELAVIL     busPIRone 10 MG tablet  Commonly  known as: BUSPAR     citalopram 20 MG tablet  Commonly known as: CeleXA  Take 1 tablet (20 mg total) by mouth once daily.     cloNIDine 0.1 MG tablet  Commonly known as: CATAPRES     folic acid 1 MG tablet  Commonly known as: FOLVITE  Take 1 tablet (1 mg total) by mouth once daily.     hydrOXYzine pamoate 25 MG Cap  Commonly known as: VISTARIL  Take 1 capsule (25 mg total) by mouth every 4 (four) hours as needed (Anxiety).     ondansetron 4 MG tablet  Commonly known as: ZOFRAN  Take 1 tablet (4 mg total) by mouth every 8 (eight) hours as needed.     * pantoprazole 40 MG tablet  Commonly known as: PROTONIX  Take 1 tablet (40 mg total) by mouth once daily.     * pantoprazole 40 MG tablet  Commonly known as: PROTONIX  Take 1 tablet (40 mg total) by mouth once daily.     sildenafil 20 mg Tab  Commonly known as: REVATIO         * This list has 2 medication(s) that are the same as other medications prescribed for you. Read the directions carefully, and ask your doctor or other care provider to review them with you.               Current Discharge Medication List            ED Diagnosis  1. Encounter for medication refill             Clinical Impression:   Final diagnoses:  [Z76.0] Encounter for medication refill (Primary)          ED Disposition Condition    Discharge Stable        ED Prescriptions     None        Follow-up Information     Follow up With Specialties Details Why Contact Info Additional Information    [facility]  Schedule an appointment as soon as possible for a visit in 1 week  [address]  [place] [address]  [phone]       [facility] - Internal Medicine Internal Medicine Schedule an appointment as soon as possible for a visit in 1 week  [address]  [facility] [id number] [phone] Please park in surface lot and check in at main registration.    [facility] - Emergency Dept Emergency Medicine  As needed, If symptoms worsen [address]  [facility]  [address]  [phone]            Casey Bacon Jr., MD  03/20/22 0545

## 2022-03-26 ENCOUNTER — HOSPITAL ENCOUNTER (EMERGENCY)
Facility: HOSPITAL | Age: 32
Discharge: PSYCHIATRIC OR SUBSTANCE USE FACILITY | End: 2022-03-27
Attending: EMERGENCY MEDICINE
Payer: MEDICAID

## 2022-03-26 DIAGNOSIS — F29 PSYCHOSIS, UNSPECIFIED PSYCHOSIS TYPE: ICD-10-CM

## 2022-03-26 DIAGNOSIS — F10.10 ALCOHOL ABUSE: Primary | ICD-10-CM

## 2022-03-26 LAB
BASOPHILS # BLD AUTO: 0.05 K/UL (ref 0–0.2)
BASOPHILS NFR BLD: 0.7 % (ref 0–1.9)
DIFFERENTIAL METHOD: NORMAL
EOSINOPHIL # BLD AUTO: 0.3 K/UL (ref 0–0.5)
EOSINOPHIL NFR BLD: 3.9 % (ref 0–8)
ERYTHROCYTE [DISTWIDTH] IN BLOOD BY AUTOMATED COUNT: 13.2 % (ref 11.5–14.5)
HCT VFR BLD AUTO: 42.6 % (ref 40–54)
HGB BLD-MCNC: 14.5 G/DL (ref 14–18)
IMM GRANULOCYTES # BLD AUTO: 0.03 K/UL (ref 0–0.04)
IMM GRANULOCYTES NFR BLD AUTO: 0.4 % (ref 0–0.5)
LYMPHOCYTES # BLD AUTO: 2.1 K/UL (ref 1–4.8)
LYMPHOCYTES NFR BLD: 31.6 % (ref 18–48)
MCH RBC QN AUTO: 30.1 PG (ref 27–31)
MCHC RBC AUTO-ENTMCNC: 34 G/DL (ref 32–36)
MCV RBC AUTO: 89 FL (ref 82–98)
MONOCYTES # BLD AUTO: 0.5 K/UL (ref 0.3–1)
MONOCYTES NFR BLD: 7 % (ref 4–15)
NEUTROPHILS # BLD AUTO: 3.8 K/UL (ref 1.8–7.7)
NEUTROPHILS NFR BLD: 56.4 % (ref 38–73)
NRBC BLD-RTO: 0 /100 WBC
PLATELET # BLD AUTO: 317 K/UL (ref 150–450)
PMV BLD AUTO: 10.3 FL (ref 9.2–12.9)
RBC # BLD AUTO: 4.81 M/UL (ref 4.6–6.2)
WBC # BLD AUTO: 6.71 K/UL (ref 3.9–12.7)

## 2022-03-26 PROCEDURE — 81003 URINALYSIS AUTO W/O SCOPE: CPT | Mod: 59,ER | Performed by: EMERGENCY MEDICINE

## 2022-03-26 PROCEDURE — 80053 COMPREHEN METABOLIC PANEL: CPT | Mod: ER | Performed by: EMERGENCY MEDICINE

## 2022-03-26 PROCEDURE — 99285 EMERGENCY DEPT VISIT HI MDM: CPT | Mod: 25,ER

## 2022-03-26 PROCEDURE — 84443 ASSAY THYROID STIM HORMONE: CPT | Mod: ER | Performed by: EMERGENCY MEDICINE

## 2022-03-26 PROCEDURE — 82077 ASSAY SPEC XCP UR&BREATH IA: CPT | Mod: ER | Performed by: EMERGENCY MEDICINE

## 2022-03-26 PROCEDURE — 80307 DRUG TEST PRSMV CHEM ANLYZR: CPT | Mod: ER | Performed by: EMERGENCY MEDICINE

## 2022-03-26 PROCEDURE — 85025 COMPLETE CBC W/AUTO DIFF WBC: CPT | Mod: ER | Performed by: EMERGENCY MEDICINE

## 2022-03-26 PROCEDURE — 96365 THER/PROPH/DIAG IV INF INIT: CPT | Mod: ER

## 2022-03-26 PROCEDURE — 96366 THER/PROPH/DIAG IV INF ADDON: CPT | Mod: ER

## 2022-03-26 PROCEDURE — 80143 DRUG ASSAY ACETAMINOPHEN: CPT | Mod: ER | Performed by: EMERGENCY MEDICINE

## 2022-03-26 PROCEDURE — 96368 THER/DIAG CONCURRENT INF: CPT | Mod: ER

## 2022-03-26 PROCEDURE — 80179 DRUG ASSAY SALICYLATE: CPT | Mod: ER | Performed by: EMERGENCY MEDICINE

## 2022-03-27 VITALS
RESPIRATION RATE: 18 BRPM | TEMPERATURE: 98 F | DIASTOLIC BLOOD PRESSURE: 71 MMHG | OXYGEN SATURATION: 98 % | HEART RATE: 79 BPM | WEIGHT: 165 LBS | BODY MASS INDEX: 25.9 KG/M2 | HEIGHT: 67 IN | SYSTOLIC BLOOD PRESSURE: 127 MMHG

## 2022-03-27 LAB
ALBUMIN SERPL BCP-MCNC: 4 G/DL (ref 3.5–5.2)
ALP SERPL-CCNC: 79 U/L (ref 55–135)
ALT SERPL W/O P-5'-P-CCNC: 18 U/L (ref 10–44)
AMPHET+METHAMPHET UR QL: NEGATIVE
ANION GAP SERPL CALC-SCNC: 10 MMOL/L (ref 8–16)
APAP SERPL-MCNC: <3 UG/ML (ref 10–20)
AST SERPL-CCNC: 27 U/L (ref 10–40)
BARBITURATES UR QL SCN>200 NG/ML: NEGATIVE
BENZODIAZ UR QL SCN>200 NG/ML: NEGATIVE
BILIRUB SERPL-MCNC: 0.2 MG/DL (ref 0.1–1)
BILIRUB UR QL STRIP: NEGATIVE
BUN SERPL-MCNC: 11 MG/DL (ref 6–20)
BZE UR QL SCN: NEGATIVE
CALCIUM SERPL-MCNC: 8.7 MG/DL (ref 8.7–10.5)
CANNABINOIDS UR QL SCN: NEGATIVE
CHLORIDE SERPL-SCNC: 109 MMOL/L (ref 95–110)
CLARITY UR REFRACT.AUTO: CLEAR
CO2 SERPL-SCNC: 27 MMOL/L (ref 23–29)
COLOR UR AUTO: YELLOW
CREAT SERPL-MCNC: 0.8 MG/DL (ref 0.5–1.4)
CREAT UR-MCNC: 13 MG/DL (ref 23–375)
EST. GFR  (AFRICAN AMERICAN): >60 ML/MIN/1.73 M^2
EST. GFR  (NON AFRICAN AMERICAN): >60 ML/MIN/1.73 M^2
ETHANOL SERPL-MCNC: 200 MG/DL
ETHANOL SERPL-MCNC: 347 MG/DL
ETHANOL SERPL-MCNC: 76 MG/DL
GLUCOSE SERPL-MCNC: 109 MG/DL (ref 70–110)
GLUCOSE UR QL STRIP: NEGATIVE
HGB UR QL STRIP: NEGATIVE
KETONES UR QL STRIP: NEGATIVE
LEUKOCYTE ESTERASE UR QL STRIP: NEGATIVE
METHADONE UR QL SCN>300 NG/ML: NEGATIVE
NITRITE UR QL STRIP: NEGATIVE
OPIATES UR QL SCN: NEGATIVE
PCP UR QL SCN>25 NG/ML: NEGATIVE
PH UR STRIP: 6 [PH] (ref 5–8)
POTASSIUM SERPL-SCNC: 4.1 MMOL/L (ref 3.5–5.1)
PROT SERPL-MCNC: 6.4 G/DL (ref 6–8.4)
PROT UR QL STRIP: NEGATIVE
SALICYLATES SERPL-MCNC: <5 MG/DL (ref 15–30)
SARS-COV-2 RDRP RESP QL NAA+PROBE: NEGATIVE
SODIUM SERPL-SCNC: 146 MMOL/L (ref 136–145)
SP GR UR STRIP: <=1.005 (ref 1–1.03)
TOXICOLOGY INFORMATION: ABNORMAL
TSH SERPL DL<=0.005 MIU/L-ACNC: 0.91 UIU/ML (ref 0.4–4)
URN SPEC COLLECT METH UR: ABNORMAL
UROBILINOGEN UR STRIP-ACNC: NEGATIVE EU/DL

## 2022-03-27 PROCEDURE — 25000003 PHARM REV CODE 250: Mod: ER | Performed by: EMERGENCY MEDICINE

## 2022-03-27 PROCEDURE — U0002 COVID-19 LAB TEST NON-CDC: HCPCS | Mod: ER | Performed by: EMERGENCY MEDICINE

## 2022-03-27 PROCEDURE — 82077 ASSAY SPEC XCP UR&BREATH IA: CPT | Mod: ER | Performed by: EMERGENCY MEDICINE

## 2022-03-27 PROCEDURE — 63600175 PHARM REV CODE 636 W HCPCS: Mod: ER | Performed by: EMERGENCY MEDICINE

## 2022-03-27 PROCEDURE — 82077 ASSAY SPEC XCP UR&BREATH IA: CPT | Mod: 91,ER | Performed by: EMERGENCY MEDICINE

## 2022-03-27 RX ORDER — ACETAMINOPHEN 325 MG/1
650 TABLET ORAL
Status: COMPLETED | OUTPATIENT
Start: 2022-03-27 | End: 2022-03-27

## 2022-03-27 RX ADMIN — ACETAMINOPHEN 650 MG: 325 TABLET ORAL at 09:03

## 2022-03-27 RX ADMIN — ASCORBIC ACID, VITAMIN A PALMITATE, CHOLECALCIFEROL, THIAMINE HYDROCHLORIDE, RIBOFLAVIN-5 PHOSPHATE SODIUM, PYRIDOXINE HYDROCHLORIDE, NIACINAMIDE, DEXPANTHENOL, ALPHA-TOCOPHEROL ACETATE, VITAMIN K1, FOLIC ACID, BIOTIN, CYANOCOBALAMIN: 200; 3300; 200; 6; 3.6; 6; 40; 15; 10; 150; 600; 60; 5 INJECTION, SOLUTION INTRAVENOUS at 03:03

## 2022-03-27 RX ADMIN — THIAMINE HYDROCHLORIDE 100 MG: 100 INJECTION, SOLUTION INTRAMUSCULAR; INTRAVENOUS at 03:03

## 2022-03-27 NOTE — ED NOTES
Patient ambulating to bathroom, states he does not remember what led to him being here. States he does not want to harm himself or others.

## 2022-03-27 NOTE — ED NOTES
Pt sleeping, woke easily.  Calm and cooperative. NS with vitamins infusing with no problems. Pt not medically cleared at this time. Denies SI/HI. Aware of PEC and pending blood draw. RT Alea sitter at bedside.
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
## 2022-03-27 NOTE — ED NOTES
Patient inventory:   1 brown wallet: 36 dollars, (1) [place] Purchase card, (1) [employer] Serve card, (1) [employer] Money card, (1) [employer] Relia Card    , (1) drivers license and (1) Medicaid card.  (1) pair blue jeans, (1) blue shirts, (1) pair gray shoes. (1) unopened pack [employer]arettes. (1) silver color cross on neck chain.   Items are in [employer] [facility].

## 2022-03-27 NOTE — ED NOTES
pts mom called to check on him, update given with verbal permission from patient. She can be reached at [phone].

## 2022-03-28 PROBLEM — F10.10 ALCOHOL ABUSE: Status: ACTIVE | Noted: 2022-03-28

## 2022-04-11 ENCOUNTER — HISTORICAL (OUTPATIENT)
Dept: ADMINISTRATIVE | Facility: HOSPITAL | Age: 32
End: 2022-04-11
Payer: MEDICAID

## 2022-04-25 VITALS
SYSTOLIC BLOOD PRESSURE: 112 MMHG | HEIGHT: 67 IN | DIASTOLIC BLOOD PRESSURE: 68 MMHG | BODY MASS INDEX: 27.89 KG/M2 | WEIGHT: 177.69 LBS

## 2022-06-27 PROBLEM — Z13.9 ENCOUNTER FOR MEDICAL SCREENING EXAMINATION: Status: RESOLVED | Noted: 2021-07-13 | Resolved: 2022-06-27

## 2024-04-22 ENCOUNTER — HOSPITAL ENCOUNTER (EMERGENCY)
Facility: HOSPITAL | Age: 34
Discharge: HOME | End: 2024-04-22
Attending: EMERGENCY MEDICINE
Payer: MEDICAID

## 2024-04-22 VITALS
HEART RATE: 91 BPM | DIASTOLIC BLOOD PRESSURE: 86 MMHG | OXYGEN SATURATION: 100 % | HEIGHT: 67 IN | WEIGHT: 155.63 LBS | RESPIRATION RATE: 16 BRPM | SYSTOLIC BLOOD PRESSURE: 135 MMHG | BODY MASS INDEX: 24.43 KG/M2 | TEMPERATURE: 98 F

## 2024-04-22 DIAGNOSIS — L03.90 CELLULITIS, UNSPECIFIED CELLULITIS SITE: Primary | ICD-10-CM

## 2024-04-22 PROCEDURE — 25000003 PHARM REV CODE 250: Mod: ER | Performed by: EMERGENCY MEDICINE

## 2024-04-22 PROCEDURE — 99284 EMERGENCY DEPT VISIT MOD MDM: CPT | Mod: ER

## 2024-04-22 RX ORDER — SULFAMETHOXAZOLE AND TRIMETHOPRIM 800; 160 MG/1; MG/1
1 TABLET ORAL
Status: COMPLETED | OUTPATIENT
Start: 2024-04-22 | End: 2024-04-22

## 2024-04-22 RX ORDER — SULFAMETHOXAZOLE AND TRIMETHOPRIM 800; 160 MG/1; MG/1
1 TABLET ORAL 2 TIMES DAILY
Qty: 14 TABLET | Refills: 0 | Status: SHIPPED | OUTPATIENT
Start: 2024-04-22 | End: 2024-04-29

## 2024-04-22 RX ORDER — BACITRACIN ZINC 500 UNIT/G
OINTMENT (GRAM) TOPICAL
Status: COMPLETED | OUTPATIENT
Start: 2024-04-22 | End: 2024-04-22

## 2024-04-22 RX ORDER — MUPIROCIN 20 MG/G
OINTMENT TOPICAL 3 TIMES DAILY
Qty: 22 G | Refills: 0 | Status: SHIPPED | OUTPATIENT
Start: 2024-04-22

## 2024-04-22 RX ADMIN — SULFAMETHOXAZOLE AND TRIMETHOPRIM 1 TABLET: 800; 160 TABLET ORAL at 08:04

## 2024-04-22 RX ADMIN — BACITRACIN ZINC: 500 OINTMENT TOPICAL at 08:04
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
## 2024-05-02 ENCOUNTER — HOSPITAL ENCOUNTER (OUTPATIENT)
Facility: HOSPITAL | Age: 34
Discharge: HOME | End: 2024-05-04
Attending: EMERGENCY MEDICINE | Admitting: HOSPITALIST
Payer: MEDICAID

## 2024-05-02 DIAGNOSIS — R07.9 CHEST PAIN: ICD-10-CM
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
## 2024-05-02 DIAGNOSIS — L03.113 CELLULITIS OF RIGHT HAND: ICD-10-CM

## 2024-05-02 DIAGNOSIS — L03.113 CELLULITIS OF HAND, RIGHT: Primary | ICD-10-CM

## 2024-05-02 LAB
ALBUMIN SERPL BCP-MCNC: 3.9 G/DL (ref 3.5–5.2)
ALP SERPL-CCNC: 95 U/L (ref 55–135)
ALT SERPL W/O P-5'-P-CCNC: 29 U/L (ref 10–44)
ANION GAP SERPL CALC-SCNC: 11 MMOL/L (ref 8–16)
AST SERPL-CCNC: 28 U/L (ref 10–40)
BASOPHILS # BLD AUTO: 0.04 K/UL (ref 0–0.2)
BASOPHILS NFR BLD: 0.7 % (ref 0–1.9)
BILIRUB SERPL-MCNC: 0.3 MG/DL (ref 0.1–1)
BUN SERPL-MCNC: 12 MG/DL (ref 6–20)
CALCIUM SERPL-MCNC: 8.9 MG/DL (ref 8.7–10.5)
CHLORIDE SERPL-SCNC: 106 MMOL/L (ref 95–110)
CO2 SERPL-SCNC: 22 MMOL/L (ref 23–29)
CREAT SERPL-MCNC: 0.8 MG/DL (ref 0.5–1.4)
DIFFERENTIAL METHOD BLD: NORMAL
EOSINOPHIL # BLD AUTO: 0.3 K/UL (ref 0–0.5)
EOSINOPHIL NFR BLD: 5.2 % (ref 0–8)
ERYTHROCYTE [DISTWIDTH] IN BLOOD BY AUTOMATED COUNT: 12.5 % (ref 11.5–14.5)
EST. GFR  (NO RACE VARIABLE): >60 ML/MIN/1.73 M^2
GLUCOSE SERPL-MCNC: 93 MG/DL (ref 70–110)
HCT VFR BLD AUTO: 44.9 % (ref 40–54)
HGB BLD-MCNC: 15 G/DL (ref 14–18)
IMM GRANULOCYTES # BLD AUTO: 0.01 K/UL (ref 0–0.04)
IMM GRANULOCYTES NFR BLD AUTO: 0.2 % (ref 0–0.5)
LACTATE SERPL-SCNC: 1 MMOL/L (ref 0.5–2.2)
LYMPHOCYTES # BLD AUTO: 2.3 K/UL (ref 1–4.8)
LYMPHOCYTES NFR BLD: 37.8 % (ref 18–48)
MCH RBC QN AUTO: 29.5 PG (ref 27–31)
MCHC RBC AUTO-ENTMCNC: 33.4 G/DL (ref 32–36)
MCV RBC AUTO: 88 FL (ref 82–98)
MONOCYTES # BLD AUTO: 0.4 K/UL (ref 0.3–1)
MONOCYTES NFR BLD: 7.2 % (ref 4–15)
NEUTROPHILS # BLD AUTO: 2.9 K/UL (ref 1.8–7.7)
NEUTROPHILS NFR BLD: 48.9 % (ref 38–73)
NRBC BLD-RTO: 0 /100 WBC
PLATELET # BLD AUTO: 347 K/UL (ref 150–450)
PMV BLD AUTO: 9.9 FL (ref 9.2–12.9)
POTASSIUM SERPL-SCNC: 4.2 MMOL/L (ref 3.5–5.1)
PROT SERPL-MCNC: 7.2 G/DL (ref 6–8.4)
RBC # BLD AUTO: 5.08 M/UL (ref 4.6–6.2)
SODIUM SERPL-SCNC: 139 MMOL/L (ref 136–145)
WBC # BLD AUTO: 5.98 K/UL (ref 3.9–12.7)

## 2024-05-02 PROCEDURE — 25000003 PHARM REV CODE 250: Mod: ER | Performed by: EMERGENCY MEDICINE

## 2024-05-02 PROCEDURE — 63600175 PHARM REV CODE 636 W HCPCS: Mod: ER | Performed by: NURSE PRACTITIONER

## 2024-05-02 PROCEDURE — 85025 COMPLETE CBC W/AUTO DIFF WBC: CPT | Mod: ER | Performed by: EMERGENCY MEDICINE

## 2024-05-02 PROCEDURE — 90715 TDAP VACCINE 7 YRS/> IM: CPT | Mod: ER | Performed by: NURSE PRACTITIONER

## 2024-05-02 PROCEDURE — 90471 IMMUNIZATION ADMIN: CPT | Mod: ER | Performed by: NURSE PRACTITIONER

## 2024-05-02 PROCEDURE — 63600175 PHARM REV CODE 636 W HCPCS: Mod: ER | Performed by: EMERGENCY MEDICINE

## 2024-05-02 PROCEDURE — 87040 BLOOD CULTURE FOR BACTERIA: CPT | Mod: 59 | Performed by: EMERGENCY MEDICINE

## 2024-05-02 PROCEDURE — 99285 EMERGENCY DEPT VISIT HI MDM: CPT | Mod: 25,ER

## 2024-05-02 PROCEDURE — 96365 THER/PROPH/DIAG IV INF INIT: CPT | Mod: ER

## 2024-05-02 PROCEDURE — 80053 COMPREHEN METABOLIC PANEL: CPT | Mod: ER | Performed by: EMERGENCY MEDICINE

## 2024-05-02 PROCEDURE — 83605 ASSAY OF LACTIC ACID: CPT | Mod: ER | Performed by: EMERGENCY MEDICINE

## 2024-05-02 RX ORDER — DOXYCYCLINE HYCLATE 100 MG
100 TABLET ORAL
Status: COMPLETED | OUTPATIENT
Start: 2024-05-02 | End: 2024-05-02

## 2024-05-02 RX ADMIN — CEFTRIAXONE 1 G: 1 INJECTION, POWDER, FOR SOLUTION INTRAMUSCULAR; INTRAVENOUS at 10:05

## 2024-05-02 RX ADMIN — TETANUS TOXOID, REDUCED DIPHTHERIA TOXOID AND ACELLULAR PERTUSSIS VACCINE, ADSORBED 0.5 ML: 5; 2.5; 8; 8; 2.5 SUSPENSION INTRAMUSCULAR at 09:05

## 2024-05-02 RX ADMIN — DOXYCYCLINE HYCLATE 100 MG: 100 TABLET, COATED ORAL at 10:05

## 2024-05-03 PROBLEM — Z72.0 TOBACCO ABUSE: Status: ACTIVE | Noted: 2024-05-03

## 2024-05-03 PROBLEM — L03.113 CELLULITIS OF RIGHT HAND: Status: ACTIVE | Noted: 2024-05-03
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
## 2024-05-03 PROCEDURE — S4991 NICOTINE PATCH NONLEGEND: HCPCS | Performed by: NURSE PRACTITIONER

## 2024-05-03 PROCEDURE — 96365 THER/PROPH/DIAG IV INF INIT: CPT | Mod: 59

## 2024-05-03 PROCEDURE — 99204 OFFICE O/P NEW MOD 45 MIN: CPT | Mod: ,,, | Performed by: PHYSICIAN ASSISTANT

## 2024-05-03 PROCEDURE — G0378 HOSPITAL OBSERVATION PER HR: HCPCS

## 2024-05-03 PROCEDURE — 25000003 PHARM REV CODE 250: Performed by: NURSE PRACTITIONER

## 2024-05-03 PROCEDURE — 63600175 PHARM REV CODE 636 W HCPCS: Performed by: NURSE PRACTITIONER

## 2024-05-03 PROCEDURE — 96361 HYDRATE IV INFUSION ADD-ON: CPT

## 2024-05-03 PROCEDURE — 25000003 PHARM REV CODE 250: Performed by: STUDENT IN AN ORGANIZED HEALTH CARE EDUCATION/TRAINING PROGRAM

## 2024-05-03 PROCEDURE — 96367 TX/PROPH/DG ADDL SEQ IV INF: CPT

## 2024-05-03 PROCEDURE — 96366 THER/PROPH/DIAG IV INF ADDON: CPT

## 2024-05-03 RX ORDER — IBUPROFEN 200 MG
16 TABLET ORAL
Status: DISCONTINUED | OUTPATIENT
Start: 2024-05-03 | End: 2024-05-04 | Stop reason: HOSPADM

## 2024-05-03 RX ORDER — ONDANSETRON HYDROCHLORIDE 2 MG/ML
4 INJECTION, SOLUTION INTRAVENOUS EVERY 8 HOURS PRN
Status: DISCONTINUED | OUTPATIENT
Start: 2024-05-03 | End: 2024-05-04 | Stop reason: HOSPADM

## 2024-05-03 RX ORDER — IBUPROFEN 200 MG
24 TABLET ORAL
Status: DISCONTINUED | OUTPATIENT
Start: 2024-05-03 | End: 2024-05-04 | Stop reason: HOSPADM

## 2024-05-03 RX ORDER — IBUPROFEN 200 MG
1 TABLET ORAL DAILY
Status: DISCONTINUED | OUTPATIENT
Start: 2024-05-03 | End: 2024-05-04 | Stop reason: HOSPADM

## 2024-05-03 RX ORDER — SIMETHICONE 80 MG
1 TABLET,CHEWABLE ORAL 4 TIMES DAILY PRN
Status: DISCONTINUED | OUTPATIENT
Start: 2024-05-03 | End: 2024-05-04 | Stop reason: HOSPADM

## 2024-05-03 RX ORDER — GLUCAGON 1 MG
1 KIT INJECTION
Status: DISCONTINUED | OUTPATIENT
Start: 2024-05-03 | End: 2024-05-04 | Stop reason: HOSPADM

## 2024-05-03 RX ORDER — ACETAMINOPHEN 650 MG/1
650 SUPPOSITORY RECTAL EVERY 4 HOURS PRN
Status: DISCONTINUED | OUTPATIENT
Start: 2024-05-03 | End: 2024-05-04 | Stop reason: HOSPADM

## 2024-05-03 RX ORDER — SODIUM CHLORIDE 9 MG/ML
INJECTION, SOLUTION INTRAVENOUS CONTINUOUS
Status: DISCONTINUED | OUTPATIENT
Start: 2024-05-03 | End: 2024-05-04 | Stop reason: HOSPADM

## 2024-05-03 RX ORDER — TALC
6 POWDER (GRAM) TOPICAL NIGHTLY PRN
Status: DISCONTINUED | OUTPATIENT
Start: 2024-05-03 | End: 2024-05-04 | Stop reason: HOSPADM

## 2024-05-03 RX ORDER — ACETAMINOPHEN 325 MG/1
650 TABLET ORAL EVERY 8 HOURS PRN
Status: DISCONTINUED | OUTPATIENT
Start: 2024-05-03 | End: 2024-05-04 | Stop reason: HOSPADM

## 2024-05-03 RX ORDER — HYDROCODONE BITARTRATE AND ACETAMINOPHEN 5; 325 MG/1; MG/1
1 TABLET ORAL EVERY 6 HOURS PRN
Status: DISCONTINUED | OUTPATIENT
Start: 2024-05-03 | End: 2024-05-04 | Stop reason: HOSPADM

## 2024-05-03 RX ORDER — POLYETHYLENE GLYCOL 3350 17 G/17G
17 POWDER, FOR SOLUTION ORAL DAILY PRN
Status: DISCONTINUED | OUTPATIENT
Start: 2024-05-03 | End: 2024-05-04 | Stop reason: HOSPADM

## 2024-05-03 RX ORDER — PROMETHAZINE HYDROCHLORIDE 25 MG/1
25 TABLET ORAL EVERY 6 HOURS PRN
Status: DISCONTINUED | OUTPATIENT
Start: 2024-05-03 | End: 2024-05-04 | Stop reason: HOSPADM

## 2024-05-03 RX ORDER — PANTOPRAZOLE SODIUM 40 MG/1
40 TABLET, DELAYED RELEASE ORAL DAILY
Status: DISCONTINUED | OUTPATIENT
Start: 2024-05-03 | End: 2024-05-04 | Stop reason: HOSPADM

## 2024-05-03 RX ORDER — METRONIDAZOLE 500 MG/100ML
500 INJECTION, SOLUTION INTRAVENOUS
Status: DISCONTINUED | OUTPATIENT
Start: 2024-05-03 | End: 2024-05-04 | Stop reason: HOSPADM

## 2024-05-03 RX ORDER — NALOXONE HCL 0.4 MG/ML
0.02 VIAL (ML) INJECTION
Status: DISCONTINUED | OUTPATIENT
Start: 2024-05-03 | End: 2024-05-04 | Stop reason: HOSPADM

## 2024-05-03 RX ORDER — MORPHINE SULFATE 2 MG/ML
2 INJECTION, SOLUTION INTRAMUSCULAR; INTRAVENOUS EVERY 4 HOURS PRN
Status: DISCONTINUED | OUTPATIENT
Start: 2024-05-03 | End: 2024-05-04 | Stop reason: HOSPADM

## 2024-05-03 RX ORDER — SODIUM CHLORIDE 0.9 % (FLUSH) 0.9 %
3 SYRINGE (ML) INJECTION EVERY 12 HOURS PRN
Status: DISCONTINUED | OUTPATIENT
Start: 2024-05-03 | End: 2024-05-04 | Stop reason: HOSPADM

## 2024-05-03 RX ORDER — ALUMINUM HYDROXIDE, MAGNESIUM HYDROXIDE, AND SIMETHICONE 1200; 120; 1200 MG/30ML; MG/30ML; MG/30ML
30 SUSPENSION ORAL 4 TIMES DAILY PRN
Status: DISCONTINUED | OUTPATIENT
Start: 2024-05-03 | End: 2024-05-04 | Stop reason: HOSPADM

## 2024-05-03 RX ADMIN — NICOTINE 1 PATCH: 21 PATCH, EXTENDED RELEASE TRANSDERMAL at 03:05

## 2024-05-03 RX ADMIN — METRONIDAZOLE 500 MG: 500 INJECTION, SOLUTION INTRAVENOUS at 06:05

## 2024-05-03 RX ADMIN — PANTOPRAZOLE SODIUM 40 MG: 40 TABLET, DELAYED RELEASE ORAL at 09:05

## 2024-05-03 RX ADMIN — SODIUM CHLORIDE: 9 INJECTION, SOLUTION INTRAVENOUS at 06:05

## 2024-05-03 RX ADMIN — METRONIDAZOLE 500 MG: 500 INJECTION, SOLUTION INTRAVENOUS at 03:05

## 2024-05-03 RX ADMIN — DOXYCYCLINE 100 MG: 100 INJECTION, POWDER, LYOPHILIZED, FOR SOLUTION INTRAVENOUS at 11:05

## 2024-05-03 RX ADMIN — CEFTRIAXONE 1 G: 1 INJECTION, POWDER, FOR SOLUTION INTRAMUSCULAR; INTRAVENOUS at 10:05

## 2024-05-03 RX ADMIN — DOXYCYCLINE 100 MG: 100 INJECTION, POWDER, LYOPHILIZED, FOR SOLUTION INTRAVENOUS at 10:05

## 2024-05-03 RX ADMIN — METRONIDAZOLE 500 MG: 500 INJECTION, SOLUTION INTRAVENOUS at 09:05

## 2024-05-03 RX ADMIN — SODIUM CHLORIDE: 9 INJECTION, SOLUTION INTRAVENOUS at 03:05

## 2024-05-03 NOTE — PLAN OF CARE
Pt remains free from injury  Abx given  Pt tolerates diet well  Pt verbalizes understanding of care

## 2024-05-03 NOTE — PLAN OF CARE
[facility] - Med Surg  Discharge Assessment    Primary Care Provider: Clinic, [facility]     Discharge Assessment (most recent)       BRIEF DISCHARGE ASSESSMENT - 05/03/24 1050          Discharge Planning    Assessment Type Discharge Planning Brief Assessment     Resource/Environmental Concerns none     Support Systems Parent     Assistance Needed Independent     Equipment Currently Used at Home none     Current Living Arrangements home     Patient/Family Anticipates Transition to home with family     Patient/Family Anticipated Services at Transition none     DME Needed Upon Discharge  none     Discharge Plan A Home with family                     Sw met with patient to complete assessment and to discuss d/c planning needs. Patient has no d/c needs at this time. Sw to follow up, as needed, for d/c planning purposes.

## 2024-05-03 NOTE — CONSULTS
[facility]  Orthopedics  Consult Note    Patient Name: Jared Michael Cowden  MRN: [id number]  Admission Date: 5/2/2024  Hospital Length of Stay: 0 days  Attending Provider: Jacky Carl MD  Primary Care Provider: [facility]    Patient information was obtained from patient, past medical records, ER records, and primary team.     Inpatient consult to Orthopedic Surgery  Consult performed by: Valeriano Long PA-C  Consult ordered by: Davey Jones NP        Subjective:     Principal Problem:Cellulitis of right hand    Chief Complaint:   Chief Complaint   Patient presents with    Hand Injury        HPI: Jared Cowden is a 34 y.o. male with past medical history significant for substance abuse, psychosis, depression, and tobacco abuse who is admitted for cellulitis of the right hand.  Patient states that 2 or 3 days ago he was stuck by a fish ac in the webspace between the 1st and 2nd digits of the right hand.  He went to the emergency department yesterday because pain and redness had increased and he experienced purulent drainage.  He is doing much better at this time after being started on IV antibiotics.    Past Medical History:   Diagnosis Date    Anxiety about health     Arthritis     Chronic pain     ETOH abuse     Neuropathy        Past Surgical History:   Procedure Laterality Date    ANKLE SURGERY      Right    HIP SURGERY      PELVIC SYMPHYSIS FUSION         Review of patient's allergies indicates:   Allergen Reactions    Amoxicillin     Penicillins Other (See Comments)     Not sure       Current Facility-Administered Medications   Medication Dose Route Frequency Provider Last Rate Last Admin    0.9%  NaCl infusion   Intravenous Continuous Davey Jones  mL/hr at 05/03/24 0323 New Bag at 05/03/24 0323    acetaminophen suppository 650 mg  650 mg Rectal Q4H PRN Davey Jones NP        acetaminophen tablet 650 mg  650 mg Oral Q8H PRN Davey Jones NP         aluminum-magnesium hydroxide-simethicone 200-200-20 mg/5 mL suspension 30 mL  30 mL Oral QID PRN Davey Jones NP        cefTRIAXone (Rocephin) 1 g in dextrose 5 % in water (D5W) 100 mL IVPB (MB+)  1 g Intravenous Q24H Davey Jones NP        dextrose 10% bolus 125 mL 125 mL  12.5 g Intravenous PRN Davey Jones NP        dextrose 10% bolus 250 mL 250 mL  25 g Intravenous PRN Davey Jones NP        doxycycline 100 mg in dextrose 5 % in water (D5W) 100 mL IVPB (MB+)  100 mg Intravenous Q12H Davey Jones NP        glucagon (human recombinant) injection 1 mg  1 mg Intramuscular PRN Davey Jones NP        glucose chewable tablet 16 g  16 g Oral PRN Davey Jones NP        glucose chewable tablet 24 g  24 g Oral PRN Davey Jones NP        HYDROcodone-acetaminophen 5-325 mg per tablet 1 tablet  1 tablet Oral Q6H PRN Davey Jones NP        melatonin tablet 6 mg  6 mg Oral Nightly PRN Davey Jones NP        metronidazole IVPB 500 mg  500 mg Intravenous Q8H Davey Jones  mL/hr at 05/03/24 0933 500 mg at 05/03/24 0933    morphine injection 2 mg  2 mg Intravenous Q4H PRN Davey Jones NP        naloxone 0.4 mg/mL injection 0.02 mg  0.02 mg Intravenous PRN Davey Jones NP        nicotine 21 mg/24 hr 1 patch  1 patch Transdermal Daily Davey Jones NP   1 patch at 05/03/24 0323    ondansetron injection 4 mg  4 mg Intravenous Q8H PRN Davey Jones NP        pantoprazole EC tablet 40 mg  40 mg Oral Daily Jacky Carl MD   40 mg at 05/03/24 0924    polyethylene glycol packet 17 g  17 g Oral Daily PRN Davey Jones NP        promethazine tablet 25 mg  25 mg Oral Q6H PRN Davey Jones NP        simethicone chewable tablet 80 mg  1 tablet Oral QID PRN Davey Jones, NP        sodium chloride 0.9% flush 3 mL  3 mL Intravenous Q12H PRN Davey Jones, NP         Family History    None       Tobacco Use     "Smoking status: Every Day     Current packs/day: 1.00     Types: Cigarettes    Smokeless tobacco: Never    Tobacco comments:     receptive to smoking cessation   Substance and Sexual Activity    Alcohol use: Yes     Comment: "12 pack" a few times a week; ETOH 156    Drug use: Not Currently     Types: Methamphetamines     Comment: Pt denies but UDS + for amphetamines, which Pt stated is due to Adderall Rx    Sexual activity: Not on file     Review of Systems   Constitutional: Negative for chills, decreased appetite, fever and weight loss.   HENT:  Negative for congestion, hoarse voice and sore throat.    Eyes:  Negative for blurred vision, double vision, vision loss in left eye and vision loss in right eye.   Cardiovascular:  Negative for chest pain, palpitations and syncope.   Respiratory:  Negative for cough, shortness of breath and wheezing.    Endocrine: Negative for cold intolerance and heat intolerance.   Hematologic/Lymphatic: Negative for bleeding problem. Does not bruise/bleed easily.   Skin:  Negative for dry skin, flushing, itching and suspicious lesions.   Musculoskeletal:  Negative for falls, joint pain and joint swelling.   Gastrointestinal:  Negative for abdominal pain, diarrhea, nausea and vomiting.   Genitourinary:  Negative for dysuria, frequency and urgency.   Neurological:  Negative for dizziness, headaches, numbness and paresthesias.   Psychiatric/Behavioral:  Negative for altered mental status and memory loss.      Objective:     Vital Signs (Most Recent):  Temp: 98 °F (36.7 °C) (05/03/24 0716)  Pulse: 70 (05/03/24 0716)  Resp: 18 (05/03/24 0716)  BP: 132/89 (05/03/24 0716)  SpO2: 99 % (05/03/24 0749) Vital Signs (24h Range):  Temp:  [98 °F (36.7 °C)-98.3 °F (36.8 °C)] 98 °F (36.7 °C)  Pulse:  [70-87] 70  Resp:  [17-20] 18  SpO2:  [98 %-100 %] 99 %  BP: (128-146)/(71-89) 132/89     Weight: 67.5 kg (148 lb 13 oz)  Height: 5' 7" (170.2 cm)  Body mass index is 23.31 kg/m².      Intake/Output " Summary (Last 24 hours) at 5/3/2024 0956  Last data filed at 5/3/2024 0323  Gross per 24 hour   Intake 250 ml   Output --   Net 250 ml       Ortho/SPM Exam  Right hand:   Wound is in place with no current drainage   Minimal edema   Moderate TTP in the 1st webspace   No erythema   ROM full  Compartments are soft and compressible  Motor exam normal   Sensation and pulses intact  Cap refill brisk    GEN: Well developed, well nourished male. AAOX3. No acute distress.   Head: Normocephalic, atraumatic.   Eyes: CESAR  Neck: Trachea is midline, no adenopathy  Resp: Breathing unlabored.  Neuro: Motor function normal, Cranial nerves intact  Psych: Mood and affect appropriate.      Significant Labs:   Recent Lab Results         05/02/24  2247        Albumin 3.9       ALP 95       ALT 29       Anion Gap 11       AST 28       Baso # 0.04       Basophil % 0.7       BILIRUBIN TOTAL 0.3  Comment: For infants and newborns, interpretation of results should be based  on gestational age, weight and in agreement with clinical  observations.    Premature Infant recommended reference ranges:  Up to 24 hours.............<8.0 mg/dL  Up to 48 hours............<12.0 mg/dL  3-5 days..................<15.0 mg/dL  6-29 days.................<15.0 mg/dL         BUN 12       Calcium 8.9       Chloride 106       CO2 22       Creatinine 0.8       Differential Method Automated       eGFR >60.0       Eos # 0.3       Eos % 5.2       Glucose 93       Gran # (ANC) 2.9       Gran % 48.9       Hematocrit 44.9       Hemoglobin 15.0       Immature Grans (Abs) 0.01  Comment: Mild elevation in immature granulocytes is non specific and   can be seen in a variety of conditions including stress response,   acute inflammation, trauma and pregnancy. Correlation with other   laboratory and clinical findings is essential.         Immature Granulocytes 0.2       Lactic Acid Level 1.0  Comment: Falsely low lactic acid results can be found in samples   containing >=13.0  mg/dL total bilirubin and/or >=3.5 mg/dL   direct bilirubin.         Lymph # 2.3       Lymph % 37.8       MCH 29.5       MCHC 33.4       MCV 88       Mono # 0.4       Mono % 7.2       MPV 9.9       nRBC 0       Platelet Count 347       Potassium 4.2       PROTEIN TOTAL 7.2       RBC 5.08       RDW 12.5       Sodium 139       WBC 5.98             All pertinent labs within the past 24 hours have been reviewed.    Significant Imaging: I have reviewed and interpreted all pertinent imaging results/findings.    Assessment/Plan:     Active Diagnoses:    Diagnosis Date Noted POA    PRINCIPAL PROBLEM:  Cellulitis of right hand [L03.113] 05/03/2024 Unknown    Tobacco abuse [Z72.0] 05/03/2024 Unknown      Problems Resolved During this Admission:       Assessment:   34 y.o. male with past medical history significant for substance abuse, psychosis, depression, and tobacco abuse who is admitted for cellulitis of the right hand    Plan:   Continue IV antibiotics  No indication for surgical intervention at this time  We will continue to monitor his progress with IV antibiotics    Valeriano Long PA-C  Orthopedics  [facility]
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
## 2024-05-03 NOTE — ASSESSMENT & PLAN NOTE
Patient reports smoking approximately 0.5-1pack of cigarettes daily with no thoughts of cutting back currently. Patient educated on morbidity and mortality in regards to continuation of smoking and stressed importance of cessation. Patient currently declined nicotine patch at time of admission but will be available at patient's request.  Plan:  -Nicotine patch available at patient's request

## 2024-05-03 NOTE — ASSESSMENT & PLAN NOTE
Plan:  -continue rocephin and doxy  -add flagyl for anaerobic and triple coverage due to infected wound exposed to brackish/dirty water  -Ortho consult

## 2024-05-03 NOTE — SUBJECTIVE & OBJECTIVE
Past Medical History:   Diagnosis Date    Anxiety about health     Arthritis     Chronic pain     ETOH abuse     Neuropathy        Past Surgical History:   Procedure Laterality Date    ANKLE SURGERY      Right    HIP SURGERY      PELVIC SYMPHYSIS FUSION         Review of patient's allergies indicates:   Allergen Reactions    Amoxicillin     Penicillins Other (See Comments)     Not sure       Current Facility-Administered Medications   Medication Dose Route Frequency Provider Last Rate Last Admin    0.9%  NaCl infusion   Intravenous Continuous Davey Jones  mL/hr at 05/03/24 0323 New Bag at 05/03/24 0323    acetaminophen suppository 650 mg  650 mg Rectal Q4H PRN Davey Jones NP        acetaminophen tablet 650 mg  650 mg Oral Q8H PRN Davey Jones NP        aluminum-magnesium hydroxide-simethicone 200-200-20 mg/5 mL suspension 30 mL  30 mL Oral QID PRN Davey Jones NP        cefTRIAXone (Rocephin) 1 g in dextrose 5 % in water (D5W) 100 mL IVPB (MB+)  1 g Intravenous Q24H Davey Jones NP        dextrose 10% bolus 125 mL 125 mL  12.5 g Intravenous PRN Davey Jones NP        dextrose 10% bolus 250 mL 250 mL  25 g Intravenous PRN Davey Jones NP        doxycycline 100 mg in dextrose 5 % in water (D5W) 100 mL IVPB (MB+)  100 mg Intravenous Q12H Davey Jones NP        glucagon (human recombinant) injection 1 mg  1 mg Intramuscular PRN Davey Jones NP        glucose chewable tablet 16 g  16 g Oral PRN Davey Jones NP        glucose chewable tablet 24 g  24 g Oral PRN Davey Jones NP        HYDROcodone-acetaminophen 5-325 mg per tablet 1 tablet  1 tablet Oral Q6H PRN Davey Jones NP        melatonin tablet 6 mg  6 mg Oral Nightly PRN Davey Jones NP        metronidazole IVPB 500 mg  500 mg Intravenous Q8H Davey Jones  mL/hr at 05/03/24 0323 500 mg at 05/03/24 0323    morphine injection 2 mg  2 mg Intravenous Q4H PRN  "Davey Jones NP        naloxone 0.4 mg/mL injection 0.02 mg  0.02 mg Intravenous PRN Davey Jones NP        nicotine 21 mg/24 hr 1 patch  1 patch Transdermal Daily Davey Jones NP   1 patch at 05/03/24 0323    ondansetron injection 4 mg  4 mg Intravenous Q8H PRN Davey Jones NP        polyethylene glycol packet 17 g  17 g Oral Daily PRN Davey Jones NP        promethazine tablet 25 mg  25 mg Oral Q6H PRN Davey Jones NP        simethicone chewable tablet 80 mg  1 tablet Oral QID PRN Davey Jones NP        sodium chloride 0.9% flush 3 mL  3 mL Intravenous Q12H PRN Davey Jones NP         Family History    None       Tobacco Use    Smoking status: Every Day     Current packs/day: 1.00     Types: Cigarettes    Smokeless tobacco: Never    Tobacco comments:     receptive to smoking cessation   Substance and Sexual Activity    Alcohol use: Yes     Comment: "12 pack" a few times a week; ETOH 156    Drug use: Not Currently     Types: Methamphetamines     Comment: Pt denies but UDS + for amphetamines, which Pt stated is due to Adderall Rx    Sexual activity: Not on file     Review of Systems   Constitutional:  Negative for chills, diaphoresis, fatigue and fever.   Musculoskeletal:  Positive for arthralgias and myalgias.   Skin:  Positive for color change and wound.   All other systems reviewed and are negative.    Objective:     Vital Signs (Most Recent):  Temp: 98.1 °F (36.7 °C) (05/03/24 0048)  Pulse: 87 (05/03/24 0048)  Resp: 18 (05/03/24 0048)  BP: 128/71 (05/03/24 0048)  SpO2: 98 % (05/03/24 0048) Vital Signs (24h Range):  Temp:  [98.1 °F (36.7 °C)-98.2 °F (36.8 °C)] 98.1 °F (36.7 °C)  Pulse:  [83-87] 87  Resp:  [18-20] 18  SpO2:  [98 %-100 %] 98 %  BP: (128-138)/(71-75) 128/71     Weight: 67.5 kg (148 lb 13 oz)  Body mass index is 23.31 kg/m².     Physical Exam  Vitals and nursing note reviewed.   Constitutional:       General: He is awake. He is not in acute " distress.     Appearance: Normal appearance. He is well-developed and well-groomed. He is not ill-appearing, toxic-appearing or diaphoretic.   HENT:      Head: Normocephalic and atraumatic.   Eyes:      Extraocular Movements: Extraocular movements intact.      Conjunctiva/sclera: Conjunctivae normal.   Cardiovascular:      Rate and Rhythm: Normal rate and regular rhythm.      Pulses: Normal pulses.      Heart sounds: Normal heart sounds. No murmur heard.  Pulmonary:      Effort: Pulmonary effort is normal.      Breath sounds: Normal breath sounds.   Abdominal:      General: Bowel sounds are normal.      Palpations: Abdomen is soft.      Tenderness: There is no abdominal tenderness.   Musculoskeletal:      Cervical back: Normal range of motion and neck supple.      Comments: Limited ROM of right hand (open and closing) and decreased  2/2 pain and swelling. Wiggles all digits in right hand. Cap refill<2secs in all digits of right hand. +2 radial pulse noted in right wrist. Small puncture wound to webbing of 1st and 2nd digit w/o visible drainage noted. Edema with warmth noted to right hand. Otherwise, NVS of right hand grossly intact.   Skin:     General: Skin is warm and dry.      Capillary Refill: Capillary refill takes less than 2 seconds.   Neurological:      Mental Status: He is alert and oriented to person, place, and time. Mental status is at baseline.      GCS: GCS eye subscore is 4. GCS verbal subscore is 5. GCS motor subscore is 6.      Cranial Nerves: Cranial nerves 2-12 are intact.      Sensory: Sensation is intact.   Psychiatric:         Mood and Affect: Mood normal.         Behavior: Behavior normal. Behavior is cooperative.           LABS:  Recent Results (from the past 24 hour(s))   CBC auto differential    Collection Time: 05/02/24 10:47 PM   Result Value Ref Range    WBC 5.98 3.90 - 12.70 K/uL    RBC 5.08 4.60 - 6.20 M/uL    Hemoglobin 15.0 14.0 - 18.0 g/dL    Hematocrit 44.9 40.0 - 54.0 %    MCV  88 82 - 98 fL    MCH 29.5 27.0 - 31.0 pg    MCHC 33.4 32.0 - 36.0 g/dL    RDW 12.5 11.5 - 14.5 %    Platelets 347 150 - 450 K/uL    MPV 9.9 9.2 - 12.9 fL    Immature Granulocytes 0.2 0.0 - 0.5 %    Gran # (ANC) 2.9 1.8 - 7.7 K/uL    Immature Grans (Abs) 0.01 0.00 - 0.04 K/uL    Lymph # 2.3 1.0 - 4.8 K/uL    Mono # 0.4 0.3 - 1.0 K/uL    Eos # 0.3 0.0 - 0.5 K/uL    Baso # 0.04 0.00 - 0.20 K/uL    nRBC 0 0 /100 WBC    Gran % 48.9 38.0 - 73.0 %    Lymph % 37.8 18.0 - 48.0 %    Mono % 7.2 4.0 - 15.0 %    Eosinophil % 5.2 0.0 - 8.0 %    Basophil % 0.7 0.0 - 1.9 %    Differential Method Automated    Comprehensive metabolic panel    Collection Time: 05/02/24 10:47 PM   Result Value Ref Range    Sodium 139 136 - 145 mmol/L    Potassium 4.2 3.5 - 5.1 mmol/L    Chloride 106 95 - 110 mmol/L    CO2 22 (L) 23 - 29 mmol/L    Glucose 93 70 - 110 mg/dL    BUN 12 6 - 20 mg/dL    Creatinine 0.8 0.5 - 1.4 mg/dL    Calcium 8.9 8.7 - 10.5 mg/dL    Total Protein 7.2 6.0 - 8.4 g/dL    Albumin 3.9 3.5 - 5.2 g/dL    Total Bilirubin 0.3 0.1 - 1.0 mg/dL    Alkaline Phosphatase 95 55 - 135 U/L    AST 28 10 - 40 U/L    ALT 29 10 - 44 U/L    eGFR >60.0 >60 mL/min/1.73 m^2    Anion Gap 11 8 - 16 mmol/L   Lactic acid, plasma    Collection Time: 05/02/24 10:47 PM   Result Value Ref Range    Lactate (Lactic Acid) 1.0 0.5 - 2.2 mmol/L       RADIOLOGY  X-Ray Hand 3 view Right    Result Date: 5/2/2024  EXAMINATION: XR HAND COMPLETE 3 VIEW RIGHT CLINICAL HISTORY: puncture wound, skin infection.; TECHNIQUE: PA, lateral, and oblique views of the right hand were performed. COMPARISON: None FINDINGS: No acute fracture or dislocation.  Normal bone mineral density.  Joint spaces are unremarkable.  Soft tissues within normal limits.     No acute process seen.  Findings as above.  Recommend follow-up if symptoms persist. Electronically signed by: Maurilio Holguin Date:    05/02/2024 Time:    21:18      EKG    MICROBIOLOGY    MDM     Amount and/or Complexity of Data  Reviewed  Clinical lab tests: reviewed  Tests in the radiology section of CPT®: reviewed  Tests in the medicine section of CPT®: reviewed  Discussion of test results with the performing providers: yes  Decide to obtain previous medical records or to obtain history from someone other than the patient: yes  Obtain history from someone other than the patient: yes  Review and summarize past medical records: yes  Discuss the patient with other providers: yes  Independent visualization of images, tracings, or specimens: yes
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
## 2024-05-03 NOTE — ED PROVIDER NOTES
"Encounter Date: 5/2/2024       History     Chief Complaint   Patient presents with    Hand Injury     35 y/o M who was stuck by a fish ac ([place] fish) yesterday between the 1st and 2nd finger on his right hand, and awoke to hand swelling and pain today. He has purulent drainage from the puncture site, and local edema with pain with ROM of the hand. No fever.     The history is provided by the patient.     Review of patient's allergies indicates:   Allergen Reactions    Amoxicillin     Penicillins Other (See Comments)     Not sure     Past Medical History:   Diagnosis Date    Anxiety about health     Arthritis     Chronic pain     ETOH abuse     Neuropathy      Past Surgical History:   Procedure Laterality Date    ANKLE SURGERY      Right    HIP SURGERY      PELVIC SYMPHYSIS FUSION       No family history on file.  Social History     Tobacco Use    Smoking status: Every Day     Current packs/day: 1.00     Types: Cigarettes    Smokeless tobacco: Never    Tobacco comments:     receptive to smoking cessation   Substance Use Topics    Alcohol use: Yes     Comment: "12 pack" a few times a week; ETOH 156    Drug use: Not Currently     Types: Methamphetamines     Comment: Pt denies but UDS + for amphetamines, which Pt stated is due to Adderall Rx     Review of Systems   Constitutional:  Negative for chills and fever.   HENT:  Negative for congestion and dental problem.    Eyes:  Negative for pain and visual disturbance.   Respiratory:  Negative for cough and shortness of breath.    Cardiovascular:  Negative for chest pain and palpitations.   Gastrointestinal:  Negative for abdominal pain, diarrhea, nausea and vomiting.   Genitourinary:  Negative for dysuria and flank pain.   Musculoskeletal:  Negative for back pain and neck pain.        Right hand pain   Skin:  Negative for rash and wound.   Neurological:  Negative for weakness, numbness and headaches.       Physical Exam     Initial Vitals [05/02/24 2043]   BP Pulse " Resp Temp SpO2   138/75 83 20 98.2 °F (36.8 °C) 100 %      MAP       --         Physical Exam    Constitutional: He appears well-developed and well-nourished. No distress.   Musculoskeletal:      Comments: Puncture wound in the web space between the 1st and 2nd digit on the right hand. There is purulent drainage. Localized edema. Pain with ROM.  FROM of the wrist and elbow.      Neurological: He has normal strength. No sensory deficit.         ED Course   Procedures  Labs Reviewed   COMPREHENSIVE METABOLIC PANEL - Abnormal; Notable for the following components:       Result Value    CO2 22 (*)     All other components within normal limits   CULTURE, BLOOD   CULTURE, BLOOD   CBC W/ AUTO DIFFERENTIAL   LACTIC ACID, PLASMA          Imaging Results              X-Ray Hand 3 view Right (Final result)  Result time 05/02/24 21:18:19      Final result by Maurilio Holguin MD (05/02/24 21:18:19)                   Impression:      No acute process seen.  Findings as above.  Recommend follow-up if symptoms persist.      Electronically signed by: Maurilio Holguin  Date:    05/02/2024  Time:    21:18               Narrative:    EXAMINATION:  XR HAND COMPLETE 3 VIEW RIGHT    CLINICAL HISTORY:  puncture wound, skin infection.;    TECHNIQUE:  PA, lateral, and oblique views of the right hand were performed.    COMPARISON:  None    FINDINGS:  No acute fracture or dislocation.  Normal bone mineral density.  Joint spaces are unremarkable.  Soft tissues within normal limits.                                       Medications   cefTRIAXone (Rocephin) 1 g in dextrose 5 % in water (D5W) 100 mL IVPB (MB+) (has no administration in time range)   doxycycline 100 mg in dextrose 5 % in water (D5W) 100 mL IVPB (MB+) (has no administration in time range)   metronidazole IVPB 500 mg (500 mg Intravenous New Bag 5/3/24 0323)   sodium chloride 0.9% flush 3 mL (has no administration in time range)   melatonin tablet 6 mg (has no administration in time  range)   ondansetron injection 4 mg (has no administration in time range)   promethazine tablet 25 mg (has no administration in time range)   polyethylene glycol packet 17 g (has no administration in time range)   acetaminophen tablet 650 mg (has no administration in time range)   simethicone chewable tablet 80 mg (has no administration in time range)   aluminum-magnesium hydroxide-simethicone 200-200-20 mg/5 mL suspension 30 mL (has no administration in time range)   acetaminophen suppository 650 mg (has no administration in time range)   HYDROcodone-acetaminophen 5-325 mg per tablet 1 tablet (has no administration in time range)   morphine injection 2 mg (has no administration in time range)   naloxone 0.4 mg/mL injection 0.02 mg (has no administration in time range)   glucose chewable tablet 16 g (has no administration in time range)   glucose chewable tablet 24 g (has no administration in time range)   glucagon (human recombinant) injection 1 mg (has no administration in time range)   0.9%  NaCl infusion ( Intravenous New Bag 5/3/24 0323)   nicotine 21 mg/24 hr 1 patch (1 patch Transdermal Patch Applied 5/3/24 0323)   dextrose 10% bolus 125 mL 125 mL (has no administration in time range)   dextrose 10% bolus 250 mL 250 mL (has no administration in time range)   Tdap (BOOSTRIX) vaccine injection 0.5 mL (0.5 mLs Intramuscular Given 5/2/24 2118)   doxycycline tablet 100 mg (100 mg Oral Given 5/2/24 2254)   cefTRIAXone (Rocephin) 1 g in dextrose 5 % in water (D5W) 100 mL IVPB (MB+) (0 g Intravenous Stopped 5/2/24 2327)     Medical Decision Making  DDx includes cellulitis, abscess, aquatic bacterial infection    Amount and/or Complexity of Data Reviewed  Labs: ordered. Decision-making details documented in ED Course.  Radiology: ordered and independent interpretation performed. Decision-making details documented in ED Course.    Risk  Prescription drug management.  Decision regarding hospitalization.               ED  Course as of 05/03/24 0406   Thu May 02, 2024   2215 Discussed case with Dr. Kunz with Ortho. Aquatic bacterial infection possible, possibly vibrio. Would benefit from IV abx and observation.  [BA]   6955 All historical, clinical, radiographic, and laboratory findings were reviewed with the patient/family in detail along with the indications for transport to the facility in [place] in order to receive hospitalist observation and ortho eval.  All remaining questions and concerns were addressed at this time and the patient/family communicates understanding and agrees to proceed accordingly.  Similarly, all pertinent details of the encounter were discussed with Dr. Jones who agrees to receive the patient at [facility] for further care as outlined above.  The patient will be transferred by [facility] ambulance services secondary to a need for ongoing IV abx en route.  Julio Cesar Asher MD  10:50 PM       [BA]      ED Course User Index  [BA] Julio Cesar Asher MD                             Clinical Impression:  Final diagnoses:  [L03.113] Cellulitis of hand, right (Primary)  [W56.59XA] Contact with fish          ED Disposition Condition    Observation Stable                Julio Cesar Asher MD  05/03/24 0406

## 2024-05-04 VITALS
RESPIRATION RATE: 17 BRPM | OXYGEN SATURATION: 96 % | WEIGHT: 148.81 LBS | TEMPERATURE: 98 F | HEART RATE: 73 BPM | SYSTOLIC BLOOD PRESSURE: 120 MMHG | DIASTOLIC BLOOD PRESSURE: 76 MMHG | BODY MASS INDEX: 23.36 KG/M2 | HEIGHT: 67 IN

## 2024-05-04 PROBLEM — F19.959 SUBSTANCE-INDUCED PSYCHOTIC DISORDER: Status: RESOLVED | Noted: 2021-07-12 | Resolved: 2024-05-04

## 2024-05-04 PROBLEM — F15.10 STIMULANT ABUSE: Status: RESOLVED | Noted: 2021-07-12 | Resolved: 2024-05-04

## 2024-05-04 PROCEDURE — 96366 THER/PROPH/DIAG IV INF ADDON: CPT

## 2024-05-04 PROCEDURE — 63600175 PHARM REV CODE 636 W HCPCS: Mod: JZ,JG | Performed by: NURSE PRACTITIONER

## 2024-05-04 PROCEDURE — G0378 HOSPITAL OBSERVATION PER HR: HCPCS

## 2024-05-04 PROCEDURE — 25000003 PHARM REV CODE 250: Performed by: STUDENT IN AN ORGANIZED HEALTH CARE EDUCATION/TRAINING PROGRAM

## 2024-05-04 RX ORDER — CEFDINIR 300 MG/1
300 CAPSULE ORAL 2 TIMES DAILY
Qty: 10 CAPSULE | Refills: 0 | Status: SHIPPED | OUTPATIENT
Start: 2024-05-04 | End: 2024-05-09

## 2024-05-04 RX ORDER — DOXYCYCLINE HYCLATE 100 MG
100 TABLET ORAL EVERY 12 HOURS
Qty: 10 TABLET | Refills: 0 | Status: SHIPPED | OUTPATIENT
Start: 2024-05-04 | End: 2024-05-09

## 2024-05-04 RX ADMIN — METRONIDAZOLE 500 MG: 500 INJECTION, SOLUTION INTRAVENOUS at 02:05

## 2024-05-04 RX ADMIN — PANTOPRAZOLE SODIUM 40 MG: 40 TABLET, DELAYED RELEASE ORAL at 09:05

## 2024-05-04 NOTE — DISCHARGE SUMMARY
[facility] Medicine  Discharge Summary      Patient Name: Jared Michael Cowden  MRN: [id number]  [facility]: [id number]  Patient Class: OP- Observation  Admission Date: 5/2/2024  Hospital Length of Stay: 0 days  Discharge Date and Time:  05/04/2024 11:14 AM  Attending Physician: Jacky Carl MD   Discharging Provider: Jacky Carl MD  Primary Care Provider: [facility]    Primary Care Team: Networked reference to record PCT     HPI:   Jared Michael Cowden is a 34 y.o. male with a PMH  has a past medical history of Anxiety about health, Arthritis, Chronic pain, ETOH abuse, and Neuropathy.  Presents as a transfer from our [facility] facility for Orthopedic evaluation/consult and IV antibiotics for cellulitis of right hand.  Patient reports he was fishing 2 days ago in br[place]ish water and got stuck by a fish ac ([place] fish) between the 1st and 2nd finger on his right hand while handing the fish. Reports waking up today with worsening pain, swelling, and warmth to his right hand.  Reports having some purulent drainage coming from puncture site.  Aggravating factors include palpation and movement of right hand.  Denies any alleviating factors.  Denies treatment prior to arrival to outside facility.  Denies fever, chills, sweats, nausea, vomiting,/tingling to hand, or any other symptoms at this time.    ER workup unremarkable.  Plain film imaging of right hand was negative for acute findings.  Patient was treated with 1 g Rocephin and 100 mg doxycycline p.o. at outside facility.  Patient also received updated tetanus shot.  On-call orthopedic was consulted.  Recommended overnight observation and continued antibiotics and will  evaluate patient in a.m. patient in agreement with treatment plan.  Patient will be admitted observation status.    PCP: [facility]    * No surgery found *      Hospital Course:   The patient was admitted with cellulitis and orthopedics was  "consulted due to concerns about a potential deeper infection. According to their assessment, there was no need for surgical intervention at the time. The patient was hospitalized for 48 hours to receive intravenous (IV) antibiotics, which led to noticeable improvement. The treatment plan includes continuing oral (PO) antibiotics and scheduling a follow-up visit at the trauma clinic within 1-2 weeks. Patient acknowledged understanding and agreed to the plan.     /76 (BP Location: Left arm, Patient Position: Lying)   Pulse 73   Temp 98.1 °F (36.7 °C) (Oral)   Resp 17   Ht 5' 7" (1.702 m)   Wt 67.5 kg (148 lb 13 oz)   SpO2 96%   BMI 23.31 kg/m²   PHYSICAL EXAM  Vitals Reviewed  GEN: No acute distress, pleasant, body habitus normal  HEENT: atraumatic and normocephalic  CARDS: regular rate and rhythm, no m/g, pulses palpable in LE  PULM: breathing comfortably on room air, chest symmetric, nonlabored, no abnormal breath sounds on auscultation  ABD: nontender, nondistended, soft, no organomegaly, BS+  Neuro: Alert and oriented x3, CN's I-IX grossly intact, sensation and motor intact; follows directions and answers questions appropriately       Goals of Care Treatment Preferences:  Code Status: Full Code      Consults:   Consults (From admission, onward)          Status Ordering Provider     Inpatient consult to Orthopedic Surgery  Once        Provider:  Jenny Kunz, DO    Completed TANIA IBARRA            No new Assessment & Plan notes have been filed under this hospital service since the last note was generated.  Service: Hospital Medicine    Final Active Diagnoses:    Diagnosis Date Noted POA    PRINCIPAL PROBLEM:  Cellulitis of right hand [L03.113] 05/03/2024 Unknown    Tobacco abuse [Z72.0] 05/03/2024 Unknown    Contact with fish [W56.59XA] 05/03/2024 Not Applicable      Problems Resolved During this Admission:       Discharged Condition: good    Disposition: Home or Self Care    Follow Up:   " Follow-up Information       Clinic, [facility]. Schedule an appointment as soon as possible for a visit.    Why: Routine follow up following hospitalization to reassess swelling  Contact information:  [address]  [place] [address]  [phone]                           Patient Instructions:   No discharge procedures on file.    Significant Diagnostic Studies: Labs: All labs within the past 24 hours have been reviewed    Pending Diagnostic Studies:       None           Medications:  Reconciled Home Medications:      Medication List        START taking these medications      cefdinir 300 MG capsule  Commonly known as: OMNICEF  Take 1 capsule (300 mg total) by mouth 2 (two) times daily. for 5 days     doxycycline 100 MG tablet  Commonly known as: VIBRA-TABS  Take 1 tablet (100 mg total) by mouth every 12 (twelve) hours. for 5 days            CONTINUE taking these medications      ARIPiprazole 5 MG Tab  Commonly known as: ABILIFY  Take 1 tablet (5 mg total) by mouth once daily.     atomoxetine 40 MG capsule  Commonly known as: STRATTERA  Take 1 capsule (40 mg total) by mouth once daily.     folic acid 1 MG tablet  Commonly known as: FOLVITE  Take 1 tablet (1 mg total) by mouth once daily.     hydrOXYzine pamoate 25 MG Cap  Commonly known as: VISTARIL  Take 1 capsule (25 mg total) by mouth every 4 (four) hours as needed (Anxiety).     mirtazapine 7.5 MG Tab  Commonly known as: REMERON  Take 1 tablet (7.5 mg total) by mouth every evening.     mupirocin 2 % ointment  Commonly known as: BACTROBAN  Apply topically 3 (three) times daily.     naltrexone microspheres 380 mg Ssrr kit  Commonly known as: VIVITROL  Inject 1 each (380 mg total) into the muscle every 28 days.              Indwelling Lines/Drains at time of discharge:   Lines/Drains/Airways       None                   Time spent on the discharge of patient: 25 minutes  of time spent on discharge including examining patient, providing discharge  instructions, arranging follow-up and documentation.           Jacky Carl MD  Department of Hospital Medicine  '[facility]

## 2024-05-04 NOTE — NURSING
Discharge directions reviewed with patient. Pt verbalized understanding. Education sufficient to discharge to home.

## 2024-05-04 NOTE — HOSPITAL COURSE
"The patient was admitted with cellulitis and orthopedics was consulted due to concerns about a potential deeper infection. According to their assessment, there was no need for surgical intervention at the time. The patient was hospitalized for 48 hours to receive intravenous (IV) antibiotics, which led to noticeable improvement. The treatment plan includes continuing oral (PO) antibiotics and scheduling a follow-up visit at the trauma clinic within 1-2 weeks. Patient acknowledged understanding and agreed to the plan.     /76 (BP Location: Left arm, Patient Position: Lying)   Pulse 73   Temp 98.1 °F (36.7 °C) (Oral)   Resp 17   Ht 5' 7" (1.702 m)   Wt 67.5 kg (148 lb 13 oz)   SpO2 96%   BMI 23.31 kg/m²   PHYSICAL EXAM  Vitals Reviewed  GEN: No acute distress, pleasant, body habitus normal  HEENT: atraumatic and normocephalic  CARDS: regular rate and rhythm, no m/g, pulses palpable in LE  PULM: breathing comfortably on room air, chest symmetric, nonlabored, no abnormal breath sounds on auscultation  ABD: nontender, nondistended, soft, no organomegaly, BS+  Neuro: Alert and oriented x3, CN's I-IX grossly intact, sensation and motor intact; follows directions and answers questions appropriately    "

## 2024-05-04 NOTE — PROGRESS NOTES
No acute events.  Pain controlled.    Vitals:    24 0721   BP: (!) 144/86   Pulse: 72   Resp: 16   Temp: 97.8 °F (36.6 °C)     Temp (24hrs), Av.1 °F (36.7 °C), Min:97.8 °F (36.6 °C), Max:98.7 °F (37.1 °C)  ]    NAD  RUE swelling resolved.  Moving fingers well    Labs:  Lab Results   Component Value Date/Time    HCT 44.9 2024 10:47 PM    K 4.2 2024 10:47 PM    CREATININE 0.8 2024 10:47 PM    INR 1.1 2019 12:40 AM    INR 1.0 2017 08:00 PM   ]    Plan:  No plan for OR  Brief course of abx per primary team  Can follow up in trauma clinic in 1-2 weeks for wound check or return to ER if condition worsens.      Owen Mayer M.D.  Orthopaedic Surgery  [facility]  [phone]

## 2024-05-04 NOTE — PLAN OF CARE
Problem: Adult Inpatient Plan of Care  Goal: Plan of Care Review  Outcome: Progressing     Problem: Adult Inpatient Plan of Care  Goal: Patient-Specific Goal (Individualized)  Outcome: Progressing  Flowsheets (Taken 5/4/2024 0309)  Individualized Care Needs: discharge soon  Anxieties, Fears or Concerns: sleep     Problem: Pain Acute  Goal: Optimal Pain Control and Function  Outcome: Progressing     Problem: Infection  Goal: Absence of Infection Signs and Symptoms  Outcome: Progressing       Discussed POC with pt, verbalized understanding.  Patient remains free from injury.  Safety precautions maintained.   Call light and personal belongings within reach, bed in lowest position with bed wheels locked.   No s/s of acute distress.  Purposeful rounding continued this shift.  Pain controlled per MD order.  IVF infusing.   Diet orders continued, pt diet: regular  Vital signs continued per orders this shift.   Chart and orders review completed. Pt education about care completed.

## 2024-05-04 NOTE — PLAN OF CARE
[facility] - Med Surg  Discharge Final Note    Primary Care Provider: Clinic, [facility]    Expected Discharge Date: 5/4/2024    Final Discharge Note (most recent)       Final Note - 05/04/24 0941          Final Note    Assessment Type Final Discharge Note     Anticipated Discharge Disposition Home or Self Care        Post-Acute Status    Discharge Delays None known at this time                     Important Message from Medicare             Contact Info       [facility]   Relationship: PCP - General    [address] [place] [address] [address] [place] [id number]   Phone: [phone]       Next Steps: Schedule an appointment as soon as possible for a visit    Instructions: Routine follow up following hospitalization to reassess swelling          Discharge home, no home health or dme orders noted.

## 2024-05-08 LAB
BACTERIA BLD CULT: NORMAL
BACTERIA BLD CULT: NORMAL

## 2024-10-14 ENCOUNTER — HOSPITAL ENCOUNTER (EMERGENCY)
Facility: HOSPITAL | Age: 34
Discharge: HOME | End: 2024-10-14
Attending: EMERGENCY MEDICINE
Payer: MEDICAID

## 2024-10-14 VITALS
OXYGEN SATURATION: 100 % | RESPIRATION RATE: 18 BRPM | BODY MASS INDEX: 22.34 KG/M2 | SYSTOLIC BLOOD PRESSURE: 125 MMHG | DIASTOLIC BLOOD PRESSURE: 64 MMHG | WEIGHT: 142.63 LBS | TEMPERATURE: 98 F | HEART RATE: 86 BPM

## 2024-10-14 DIAGNOSIS — S70.362A INSECT BITE OF LEFT THIGH, INITIAL ENCOUNTER: Primary | ICD-10-CM
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
## 2024-10-14 PROCEDURE — 99282 EMERGENCY DEPT VISIT SF MDM: CPT | Mod: ER

## 2024-10-14 NOTE — ED PROVIDER NOTES
"   History      Chief Complaint   Patient presents with    Insect Bite     Pt stung by something while cutting the crass on left anterior upper leg today.        Review of patient's allergies indicates:   Allergen Reactions    Amoxicillin     Penicillins Other (See Comments)     Not sure        HPI   HPI    10/14/2024, 6:24 PM   History obtained from the patient      History of Present Illness: Jared Michael Cowden is a 34 y.o. male patient who presents to the Emergency Department for possible caterpillar sting to left thigh onset just pta.    No further complaints or concerns at this time.           PCP: [facility]       Past Medical History:  Past Medical History:   Diagnosis Date    Anxiety about health     Arthritis     Chronic pain     ETOH abuse     Neuropathy          Past Surgical History:  Past Surgical History:   Procedure Laterality Date    ANKLE SURGERY      Right    HIP SURGERY      PELVIC SYMPHYSIS FUSION             Family History:  No family history on file.        Social History:  Social History     Tobacco Use    Smoking status: Every Day     Current packs/day: 1.00     Types: Cigarettes    Smokeless tobacco: Never    Tobacco comments:     receptive to smoking cessation   Substance and Sexual Activity    Alcohol use: Yes     Comment: "12 pack" a few times a week; ETOH 156    Drug use: Not Currently     Types: Methamphetamines     Comment: Pt denies but UDS + for amphetamines, which Pt stated is due to Adderall Rx    Sexual activity: Not on file       ROS     Review of Systems    Physical Exam      Initial Vitals [10/14/24 1644]   BP Pulse Resp Temp SpO2   125/64 86 18 98.2 °F (36.8 °C) 100 %      MAP       --         Physical Exam  Vital signs and nursing notes reviewed.  Constitutional: Patient is in NAD. Awake and alert. Well-developed and well-nourished.  Head: Atraumatic. Normocephalic.  Eyes:  EOM intact. Conjunctivae nl. No scleral icterus.  ENT: Mucous membranes are moist. "   Neck: Supple.  No meningismus  Cardiovascular: Regular rate and rhythm. No murmurs, rubs, or gallops.   Pulmonary/Chest: No respiratory distress. Clear to auscultation bilaterally. No wheezing, rales, or rhonchi.  Musculoskeletal: Moves all extremities. No edema.   Skin: Warm and dry.  Faint erythema to left anterior thigh  Neurological: Awake and alert. No acute focal neurological deficits are appreciated.  Psychiatric: Normal affect. Good eye contact. Appropriate in content.      ED Course          Procedures  ED Vital Signs:  Vitals:    10/14/24 1644   BP: 125/64   Pulse: 86   Resp: 18   Temp: 98.2 °F (36.8 °C)   TempSrc: Oral   SpO2: 100%   Weight: 64.7 kg (142 lb 10.2 oz)                 Imaging Results:  Imaging Results    None            The Emergency Provider reviewed the vital signs and test results, which are outlined above.    ED Discussion             Medication(s) given in the ER:  Medications - No data to display         Follow-up Information       Clinic, [facility] In 2 days.    Contact information:  [address] [place] [address]  [phone]                                    Medication List        ASK your doctor about these medications      ARIPiprazole 5 MG Tab  Commonly known as: ABILIFY  Take 1 tablet (5 mg total) by mouth once daily.     atomoxetine 40 MG capsule  Commonly known as: STRATTERA  Take 1 capsule (40 mg total) by mouth once daily.     folic acid 1 MG tablet  Commonly known as: FOLVITE  Take 1 tablet (1 mg total) by mouth once daily.     hydrOXYzine pamoate 25 MG Cap  Commonly known as: VISTARIL  Take 1 capsule (25 mg total) by mouth every 4 (four) hours as needed (Anxiety).     mirtazapine 7.5 MG Tab  Commonly known as: REMERON  Take 1 tablet (7.5 mg total) by mouth every evening.     mupirocin 2 % ointment  Commonly known as: BACTROBAN  Apply topically 3 (three) times daily.     naltrexone microspheres 380 mg Ssrr kit  Commonly known as: VIVITROL  Inject 1  each (380 mg total) into the muscle every 28 days.                  Medical Decision Making      Recommended OTC hydrocortisone cream    All findings were reviewed with the patient/family in detail.   All remaining questions and concerns were addressed at that time.  Patient/family has been counseled regarding the need for follow-up as well as the indication to return to the emergency room should new or worrisome developments occur.        MDM                 Clinical Impression:        ICD-10-CM ICD-9-CM   1. Insect bite of left thigh, initial encounter  S70.362A 916.4    W57.XXXA E906.4               Farzana Juarez, PAANGELIQUE  10/14/24 1847